# Patient Record
Sex: MALE | Race: ASIAN | NOT HISPANIC OR LATINO | URBAN - METROPOLITAN AREA
[De-identification: names, ages, dates, MRNs, and addresses within clinical notes are randomized per-mention and may not be internally consistent; named-entity substitution may affect disease eponyms.]

---

## 2020-11-07 ENCOUNTER — OUTPATIENT (OUTPATIENT)
Dept: OUTPATIENT SERVICES | Facility: HOSPITAL | Age: 50
LOS: 1 days | End: 2020-11-07
Payer: COMMERCIAL

## 2020-11-07 ENCOUNTER — APPOINTMENT (OUTPATIENT)
Dept: ULTRASOUND IMAGING | Facility: CLINIC | Age: 50
End: 2020-11-07
Payer: COMMERCIAL

## 2020-11-07 DIAGNOSIS — Z00.8 ENCOUNTER FOR OTHER GENERAL EXAMINATION: ICD-10-CM

## 2020-11-07 PROCEDURE — 76700 US EXAM ABDOM COMPLETE: CPT

## 2020-11-07 PROCEDURE — 76700 US EXAM ABDOM COMPLETE: CPT | Mod: 26

## 2022-03-09 DIAGNOSIS — Z01.812 ENCOUNTER FOR PREPROCEDURAL LABORATORY EXAMINATION: ICD-10-CM

## 2022-03-25 ENCOUNTER — NON-APPOINTMENT (OUTPATIENT)
Age: 52
End: 2022-03-25

## 2022-03-25 LAB — SARS-COV-2 N GENE NPH QL NAA+PROBE: NOT DETECTED

## 2022-03-29 ENCOUNTER — NON-APPOINTMENT (OUTPATIENT)
Age: 52
End: 2022-03-29

## 2022-03-30 ENCOUNTER — NON-APPOINTMENT (OUTPATIENT)
Age: 52
End: 2022-03-30

## 2022-03-30 ENCOUNTER — APPOINTMENT (OUTPATIENT)
Dept: PULMONOLOGY | Facility: CLINIC | Age: 52
End: 2022-03-30
Payer: COMMERCIAL

## 2022-03-30 ENCOUNTER — LABORATORY RESULT (OUTPATIENT)
Age: 52
End: 2022-03-30

## 2022-03-30 VITALS
OXYGEN SATURATION: 97 % | WEIGHT: 194 LBS | BODY MASS INDEX: 33.12 KG/M2 | HEART RATE: 87 BPM | SYSTOLIC BLOOD PRESSURE: 125 MMHG | DIASTOLIC BLOOD PRESSURE: 86 MMHG | TEMPERATURE: 97.6 F | HEIGHT: 64 IN

## 2022-03-30 DIAGNOSIS — R10.31 RIGHT LOWER QUADRANT PAIN: ICD-10-CM

## 2022-03-30 DIAGNOSIS — M25.561 PAIN IN RIGHT KNEE: ICD-10-CM

## 2022-03-30 DIAGNOSIS — G89.29 PAIN IN RIGHT KNEE: ICD-10-CM

## 2022-03-30 DIAGNOSIS — M25.562 PAIN IN RIGHT KNEE: ICD-10-CM

## 2022-03-30 LAB
ALBUMIN: 10
BILIRUB UR QL STRIP: NEGATIVE
CLARITY UR: CLEAR
COLLECTION METHOD: NORMAL
CREATININE: 100
GLUCOSE UR-MCNC: NEGATIVE
HCG UR QL: 0.2 EU/DL
HGB UR QL STRIP.AUTO: NEGATIVE
KETONES UR-MCNC: NEGATIVE
LEUKOCYTE ESTERASE UR QL STRIP: NEGATIVE
MICROALBUMIN/CREAT UR TEST STR-RTO: 30
NITRITE UR QL STRIP: NEGATIVE
PH UR STRIP: 5.5
POCT - HEMOGLOBIN (HGB), QUANTITATIVE, TRANSCUTANEOUS: 15.5
PROT UR STRIP-MCNC: NEGATIVE
SP GR UR STRIP: 1.02

## 2022-03-30 PROCEDURE — 88738 HGB QUANT TRANSCUTANEOUS: CPT

## 2022-03-30 PROCEDURE — ZZZZZ: CPT

## 2022-03-30 PROCEDURE — 94729 DIFFUSING CAPACITY: CPT

## 2022-03-30 PROCEDURE — 36415 COLL VENOUS BLD VENIPUNCTURE: CPT

## 2022-03-30 PROCEDURE — 71046 X-RAY EXAM CHEST 2 VIEWS: CPT

## 2022-03-30 PROCEDURE — 82044 UR ALBUMIN SEMIQUANTITATIVE: CPT | Mod: QW

## 2022-03-30 PROCEDURE — 94727 GAS DIL/WSHOT DETER LNG VOL: CPT

## 2022-03-30 PROCEDURE — 99396 PREV VISIT EST AGE 40-64: CPT | Mod: 25

## 2022-03-30 PROCEDURE — 94010 BREATHING CAPACITY TEST: CPT

## 2022-03-30 PROCEDURE — 93000 ELECTROCARDIOGRAM COMPLETE: CPT

## 2022-03-30 PROCEDURE — 81003 URINALYSIS AUTO W/O SCOPE: CPT | Mod: QW

## 2022-03-31 ENCOUNTER — RESULT REVIEW (OUTPATIENT)
Age: 52
End: 2022-03-31

## 2022-03-31 NOTE — DATA REVIEWED
[FreeTextEntry1] : Pulmonary function test performed in my office today: Spirometry is within normal limits; lung volume is within normal limits; diffusion is within normal limits.\par \par EKG shows sinus rhythm at 82 bpm, no acute ST changes.\par \par  POCT - A Urinalysis Dip (Automated)             Final\par \par No Documents Attached\par \par \par   Test   Result   Flag Reference Goal \par   Glucose Negative      \par   Bilirubin Negative      \par   Ketone Negative      \par   Specific Gravity 1.025      \par   Blood Negative      \par   pH 5.5      \par   Protein Negative      \par   Urobilinogen 0.2 EU/dl      \par   Nitrite Negative      \par   Leukocytes Negative      \par   Clarity Clear      \par   Collection Method Void      \par \par  Ordered by: VIRGIL HATCH       Collected/Examined: 30Mar2022 02:31PM       \par Verified by: VIRGIL HATCH 30Mar2022 03:22PM       \par  Result Communication: No patient communication needed at this time;\par Stage: Final       \par  Performed at: In Office       Performed by: VIRGIL HATCH       Resulted: 30Mar2022 02:31PM       Last Updated: 30Mar2022 03:22PM       Accession: [id number]       \par \par \par  Xray Chest 2 Views PA/Lat             Final\par \par \par Chest x-ray PA and lateral views performed in my office today showed clear lungs, no evidence of infiltrates or pleural effusions. \par \par \par  Ordered by: VIRGIL HATCH       Collected/Examined: 30Mar2022 03:21PM       \par Verified by: VIRGIL HATCH 30Mar2022 03:22PM       \par  Result Communication: No patient communication needed at this time;\par Stage: Final       \par  Performed at: In Office       Performed by: VIRGIL HATCH       Resulted: 30Mar2022 03:21PM       Last Updated: 30Mar2022 03:22PM       Accession: [id number]

## 2022-03-31 NOTE — REVIEW OF SYSTEMS
[Negative] : Heme/Lymph [Postnasal Drip] : postnasal drip [Nasal Discharge] : nasal discharge [Cough] : cough [Joint Pain] : joint pain [Joint Stiffness] : joint stiffness [FreeTextEntry9] : Bilateral knee pain

## 2022-03-31 NOTE — PLAN
[FreeTextEntry1] : Start Astelin nasal spray for postnasal drip\par Start home blood pressure log for monitoring.\par Advised Dalton on dieting, exercise and weight loss.\par Start Voltaren as needed for bilateral knee pain/osteoarthritis\par Venipuncture with labs drawn in office\par Age-appropriate counseling and preventive care screening discussed.\par Return for follow-up in 1 month.

## 2022-03-31 NOTE — HISTORY OF PRESENT ILLNESS
[FreeTextEntry1] : Dalton is a pleasant 52-year-old gentleman with history of obesity, fatty liver (VAZQUEZ), he came in complaining of right lower quadrant abdominal pain for years, also complains of chronic bilateral knee pain, also has dry cough for years, with nasal congestion, no chest pain or shortness of breath.  No history of injury.

## 2022-03-31 NOTE — ASSESSMENT
[FreeTextEntry1] : Borderline hypertension.  Bilateral knee pain, likely secondary to osteoarthritis.  Chronic dry cough likely secondary to postnasal drip.  VAZQUEZ likely secondary to history of obesity

## 2022-04-03 LAB
25(OH)D3 SERPL-MCNC: 12.8 NG/ML
ALBUMIN SERPL ELPH-MCNC: 4.6 G/DL
ALP BLD-CCNC: 97 U/L
ALT SERPL-CCNC: 30 U/L
ANION GAP SERPL CALC-SCNC: 17 MMOL/L
AST SERPL-CCNC: 21 U/L
BASOPHILS # BLD AUTO: 0.05 K/UL
BASOPHILS NFR BLD AUTO: 1 %
BILIRUB SERPL-MCNC: 0.6 MG/DL
BUN SERPL-MCNC: 17 MG/DL
CALCIUM SERPL-MCNC: 9.3 MG/DL
CHLORIDE SERPL-SCNC: 108 MMOL/L
CHOLEST SERPL-MCNC: 254 MG/DL
CO2 SERPL-SCNC: 19 MMOL/L
CREAT SERPL-MCNC: 1.32 MG/DL
EGFR: 65 ML/MIN/1.73M2
EOSINOPHIL # BLD AUTO: 0.11 K/UL
EOSINOPHIL NFR BLD AUTO: 2.3 %
GLUCOSE SERPL-MCNC: 96 MG/DL
HCT VFR BLD CALC: 48.7 %
HCV AB SER QL: NONREACTIVE
HCV S/CO RATIO: 0.21 S/CO
HDLC SERPL-MCNC: 42 MG/DL
HGB BLD-MCNC: 16.1 G/DL
IMM GRANULOCYTES NFR BLD AUTO: 0.6 %
LDLC SERPL CALC-MCNC: 154 MG/DL
LYMPHOCYTES # BLD AUTO: 1.5 K/UL
LYMPHOCYTES NFR BLD AUTO: 31.3 %
MAGNESIUM SERPL-MCNC: 2.2 MG/DL
MAN DIFF?: NORMAL
MCHC RBC-ENTMCNC: 29.4 PG
MCHC RBC-ENTMCNC: 33.1 GM/DL
MCV RBC AUTO: 89 FL
MONOCYTES # BLD AUTO: 0.3 K/UL
MONOCYTES NFR BLD AUTO: 6.3 %
NEUTROPHILS # BLD AUTO: 2.81 K/UL
NEUTROPHILS NFR BLD AUTO: 58.5 %
NONHDLC SERPL-MCNC: 212 MG/DL
PHOSPHATE SERPL-MCNC: 3.8 MG/DL
PLATELET # BLD AUTO: 179 K/UL
POTASSIUM SERPL-SCNC: 3.8 MMOL/L
PROT SERPL-MCNC: 7.1 G/DL
PSA SERPL-MCNC: 0.31 NG/ML
RBC # BLD: 5.47 M/UL
RBC # FLD: 12.3 %
SODIUM SERPL-SCNC: 144 MMOL/L
T3 SERPL-MCNC: 75 NG/DL
T3RU NFR SERPL: 1 TBI
T4 FREE SERPL-MCNC: 1.3 NG/DL
T4 SERPL-MCNC: 4.9 UG/DL
TRIGL SERPL-MCNC: 287 MG/DL
TSH SERPL-ACNC: 1.35 UIU/ML
WBC # FLD AUTO: 4.8 K/UL

## 2022-04-14 ENCOUNTER — OUTPATIENT (OUTPATIENT)
Dept: OUTPATIENT SERVICES | Facility: HOSPITAL | Age: 52
LOS: 1 days | End: 2022-04-14
Payer: COMMERCIAL

## 2022-04-14 ENCOUNTER — APPOINTMENT (OUTPATIENT)
Dept: CT IMAGING | Facility: CLINIC | Age: 52
End: 2022-04-14
Payer: COMMERCIAL

## 2022-04-14 DIAGNOSIS — R10.31 RIGHT LOWER QUADRANT PAIN: ICD-10-CM

## 2022-04-14 DIAGNOSIS — Z00.8 ENCOUNTER FOR OTHER GENERAL EXAMINATION: ICD-10-CM

## 2022-04-14 PROCEDURE — 74177 CT ABD & PELVIS W/CONTRAST: CPT

## 2022-04-14 PROCEDURE — 74177 CT ABD & PELVIS W/CONTRAST: CPT | Mod: 26

## 2022-04-19 ENCOUNTER — APPOINTMENT (OUTPATIENT)
Dept: OTOLARYNGOLOGY | Facility: CLINIC | Age: 52
End: 2022-04-19
Payer: COMMERCIAL

## 2022-04-19 VITALS
WEIGHT: 195 LBS | DIASTOLIC BLOOD PRESSURE: 77 MMHG | HEIGHT: 66 IN | HEART RATE: 89 BPM | SYSTOLIC BLOOD PRESSURE: 114 MMHG | BODY MASS INDEX: 31.34 KG/M2

## 2022-04-19 DIAGNOSIS — R04.0 EPISTAXIS: ICD-10-CM

## 2022-04-19 DIAGNOSIS — J32.9 CHRONIC SINUSITIS, UNSPECIFIED: ICD-10-CM

## 2022-04-19 PROCEDURE — 99203 OFFICE O/P NEW LOW 30 MIN: CPT

## 2022-04-19 NOTE — REVIEW OF SYSTEMS
[Nasal Congestion] : nasal congestion [Nose Bleeds] : nose bleeds [Problem Snoring] : problem snoring [Eyes Itch] : itching of the eyes [Negative] : Heme/Lymph [Patient Intake Form Reviewed] : Patient intake form was reviewed

## 2022-04-19 NOTE — PHYSICAL EXAM
[de-identified] : NASAL MUCOSAL IRRITATION/ EXPOSED BLOOD VESSELS OVER THE SEPTUM AND INFERIOR TURBINATE/ DRY MUCOSA [Normal] : mucosa is normal [Midline] : trachea located in midline position

## 2022-04-19 NOTE — ASSESSMENT
[FreeTextEntry1] : MUPIROCIN BID\par NASAL SALINE SPRAY\par EUCALYPTUS HUMIDIFIER\par CT SINUS\par F/U AFTER ABOVE

## 2022-04-19 NOTE — HISTORY OF PRESENT ILLNESS
[de-identified] : RECURRENT STUFFY NOSE FOR A WHILE\par MEDICAL HX REVIEWED\par SOMETIME HAS EPISTAXIS\par

## 2022-04-20 ENCOUNTER — APPOINTMENT (OUTPATIENT)
Dept: CT IMAGING | Facility: CLINIC | Age: 52
End: 2022-04-20
Payer: COMMERCIAL

## 2022-04-20 ENCOUNTER — OUTPATIENT (OUTPATIENT)
Dept: OUTPATIENT SERVICES | Facility: HOSPITAL | Age: 52
LOS: 1 days | End: 2022-04-20
Payer: COMMERCIAL

## 2022-04-20 ENCOUNTER — APPOINTMENT (OUTPATIENT)
Dept: ORTHOPEDIC SURGERY | Facility: CLINIC | Age: 52
End: 2022-04-20

## 2022-04-20 DIAGNOSIS — Z78.9 OTHER SPECIFIED HEALTH STATUS: ICD-10-CM

## 2022-04-20 DIAGNOSIS — J32.9 CHRONIC SINUSITIS, UNSPECIFIED: ICD-10-CM

## 2022-04-20 DIAGNOSIS — M17.12 UNILATERAL PRIMARY OSTEOARTHRITIS, LEFT KNEE: ICD-10-CM

## 2022-04-20 DIAGNOSIS — Z80.9 FAMILY HISTORY OF MALIGNANT NEOPLASM, UNSPECIFIED: ICD-10-CM

## 2022-04-20 PROCEDURE — 70486 CT MAXILLOFACIAL W/O DYE: CPT | Mod: 26

## 2022-04-20 PROCEDURE — 99203 OFFICE O/P NEW LOW 30 MIN: CPT

## 2022-04-20 PROCEDURE — 70486 CT MAXILLOFACIAL W/O DYE: CPT

## 2022-04-20 PROCEDURE — 73560 X-RAY EXAM OF KNEE 1 OR 2: CPT | Mod: LT

## 2022-04-25 VITALS — HEIGHT: 66 IN | BODY MASS INDEX: 31.34 KG/M2 | WEIGHT: 195 LBS

## 2022-04-25 NOTE — CONSULT LETTER
[Dear  ___] : Dear  [unfilled], [Consult Letter:] : I had the pleasure of evaluating your patient, [unfilled]. [Please see my note below.] : Please see my note below. [Consult Closing:] : Thank you very much for allowing me to participate in the care of this patient.  If you have any questions, please do not hesitate to contact me. [Sincerely,] : Sincerely, [FreeTextEntry3] : Valentin Rooney III, MD \par SIL[facility]\par

## 2022-04-25 NOTE — HISTORY OF PRESENT ILLNESS
[de-identified] : The patient comes in today with complaints referable to his left knee.  He states it has been going on for the last year and getting a little worse recently.  The patient states the pain is intermittent.  The patient describes the pain as cramping, uncomfortable and sore. [] : No

## 2022-04-25 NOTE — PHYSICAL EXAM
[de-identified] : Right Knee: Range of Motion in Degrees\par 	\par 	                  Claimant:    Normal:	\par Flexion Active	    135 	    135-degrees	\par Flexion Passive	    135	    135-degrees	\par Extension Active	    0-5	    0-5-degrees	\par Extension Passive	    0-5	    0-5-degrees	\par \par No weakness to flexion/extension.  No evidence of instability in the AP plane or varus or valgus stress.  Negative  Lachman.  Negative pivot shift.  Negative anterior drawer test.  Negative posterior drawer test.  Negative Ancelmo.  Negative Apley grind.  No medial or lateral joint line tenderness.  No tenderness over the medial and lateral facet of the patella.  No patellofemoral crepitations.  No lateral tilting patella.  No patellar apprehension.  No crepitation in the medial and lateral femoral condyle.  No proximal or distal swelling, edema or tenderness.  No gross motor or sensory deficits.  No intra-articular swelling.  2+ DP and PT pulses. No varus or valgus malalignment.  Skin is intact.  No rashes, scars or lesions.  \par  \par Right Knee: Range of Motion in Degrees	\par 	                  Claimant:	Normal:	\par Flexion Active	  135 	                135-degrees	\par Flexion Passive	  135	                135-degrees	\par Extension Active	  0-5	                0-5-degrees	\par Extension Passive	  0-5	                0-5-degrees\par \par No weakness to flexion/extension.  No evidence of instability in the AP plane or varus or valgus stress.  Negative  Lachman.  Negative pivot shift.  Negative anterior drawer test.  Negative posterior drawer test.  Negative Ancelmo.  Negative Apley grind.  No medial or lateral joint line tenderness.  Positive tenderness over the lateral facet of the patella.  No tenderness over the medial facet of the patella.  Positive patellofemoral crepitations.  No lateral tilting patella.  No patella apprehension.  No crepitation in the medial and lateral femoral condyle.  Mild effusion.  No gross motor or sensory deficits.  2+ DP and PT pulses.  No varus or valgus malalignment.  Skin is intact.  No rashes, scars or lesions.  \par   [de-identified] : Gait and Station:  Ambulating with a slightly antalgic to antalgic gait.  Normal Station.  [de-identified] : Appearance:  Well developed, well-nourished male in no acute distress.\par   [de-identified] : Radiographs, one to two views of the left knee, show a cyst in the patella with narrowing of the patellofemoral joint space.\par

## 2022-04-25 NOTE — ADDENDUM
[FreeTextEntry1] : This note was written by Sim Yan on 04/25/2022, acting as a scribe for Valentin Rooney III, MD

## 2022-05-09 ENCOUNTER — LABORATORY RESULT (OUTPATIENT)
Age: 52
End: 2022-05-09

## 2022-05-13 ENCOUNTER — APPOINTMENT (OUTPATIENT)
Dept: PULMONOLOGY | Facility: CLINIC | Age: 52
End: 2022-05-13

## 2022-05-15 LAB
25(OH)D3 SERPL-MCNC: 24 NG/ML
ALBUMIN SERPL ELPH-MCNC: 4.6 G/DL
ALP BLD-CCNC: 98 U/L
ALT SERPL-CCNC: 29 U/L
ANION GAP SERPL CALC-SCNC: 11 MMOL/L
AST SERPL-CCNC: 22 U/L
BILIRUB SERPL-MCNC: 0.9 MG/DL
BUN SERPL-MCNC: 18 MG/DL
CALCIUM SERPL-MCNC: 9 MG/DL
CHLORIDE SERPL-SCNC: 107 MMOL/L
CHOLEST SERPL-MCNC: 180 MG/DL
CO2 SERPL-SCNC: 26 MMOL/L
CREAT SERPL-MCNC: 1.27 MG/DL
EGFR: 68 ML/MIN/1.73M2
GLUCOSE SERPL-MCNC: 105 MG/DL
HDLC SERPL-MCNC: 45 MG/DL
LDLC SERPL CALC-MCNC: 99 MG/DL
NONHDLC SERPL-MCNC: 135 MG/DL
POTASSIUM SERPL-SCNC: 4.2 MMOL/L
PROT SERPL-MCNC: 6.8 G/DL
SODIUM SERPL-SCNC: 145 MMOL/L
T4 FREE SERPL-MCNC: 1.3 NG/DL
TRIGL SERPL-MCNC: 182 MG/DL
TSH SERPL-ACNC: 2.07 UIU/ML

## 2022-09-15 ENCOUNTER — APPOINTMENT (OUTPATIENT)
Dept: OTOLARYNGOLOGY | Facility: CLINIC | Age: 52
End: 2022-09-15

## 2022-09-15 VITALS
DIASTOLIC BLOOD PRESSURE: 83 MMHG | SYSTOLIC BLOOD PRESSURE: 122 MMHG | HEIGHT: 66 IN | HEART RATE: 94 BPM | WEIGHT: 188 LBS | BODY MASS INDEX: 30.22 KG/M2

## 2022-09-15 DIAGNOSIS — J31.0 CHRONIC RHINITIS: ICD-10-CM

## 2022-09-15 PROCEDURE — 99214 OFFICE O/P EST MOD 30 MIN: CPT | Mod: 25

## 2022-09-15 PROCEDURE — 31231 NASAL ENDOSCOPY DX: CPT

## 2022-09-15 NOTE — HISTORY OF PRESENT ILLNESS
[de-identified] : nose on the rightside sometime feels stuffy\par wants to make sure that he does not have any mass or cancer\par medical hx reviewed\par no fever

## 2022-09-15 NOTE — PHYSICAL EXAM
[de-identified] : dry nasal mucosa [Normal] : mucosa is normal [Midline] : trachea located in midline position

## 2022-09-15 NOTE — ASSESSMENT
[FreeTextEntry1] : CT SINUS REVIEWED\par NASAL SALINE SPRAY\par NASAL CYCLE EXPLAINED\par EUCALYPTUS HUMIDIFIER\par F/U 3 MONTHS PRN

## 2022-10-25 ENCOUNTER — APPOINTMENT (OUTPATIENT)
Dept: SURGERY | Facility: CLINIC | Age: 52
End: 2022-10-25

## 2022-10-25 VITALS
WEIGHT: 188 LBS | HEIGHT: 66 IN | DIASTOLIC BLOOD PRESSURE: 86 MMHG | OXYGEN SATURATION: 99 % | SYSTOLIC BLOOD PRESSURE: 126 MMHG | BODY MASS INDEX: 30.22 KG/M2 | HEART RATE: 80 BPM

## 2022-10-25 PROCEDURE — 99202 OFFICE O/P NEW SF 15 MIN: CPT

## 2022-11-06 ENCOUNTER — TRANSCRIPTION ENCOUNTER (OUTPATIENT)
Age: 52
End: 2022-11-06

## 2022-11-07 ENCOUNTER — RESULT REVIEW (OUTPATIENT)
Age: 52
End: 2022-11-07

## 2022-11-07 ENCOUNTER — OUTPATIENT (OUTPATIENT)
Dept: OUTPATIENT SERVICES | Facility: HOSPITAL | Age: 52
LOS: 1 days | End: 2022-11-07
Payer: COMMERCIAL

## 2022-11-07 ENCOUNTER — APPOINTMENT (OUTPATIENT)
Dept: SURGERY | Facility: HOSPITAL | Age: 52
End: 2022-11-07

## 2022-11-07 DIAGNOSIS — L72.0 EPIDERMAL CYST: ICD-10-CM

## 2022-11-07 PROCEDURE — 88304 TISSUE EXAM BY PATHOLOGIST: CPT | Mod: 26

## 2022-11-07 PROCEDURE — 88304 TISSUE EXAM BY PATHOLOGIST: CPT

## 2022-11-07 PROCEDURE — 11402 EXC TR-EXT B9+MARG 1.1-2 CM: CPT

## 2022-11-15 ENCOUNTER — APPOINTMENT (OUTPATIENT)
Dept: SURGERY | Facility: CLINIC | Age: 52
End: 2022-11-15

## 2022-11-15 PROCEDURE — 99213 OFFICE O/P EST LOW 20 MIN: CPT | Mod: 24

## 2022-11-17 ENCOUNTER — RESULT REVIEW (OUTPATIENT)
Age: 52
End: 2022-11-17

## 2023-01-27 ENCOUNTER — NON-APPOINTMENT (OUTPATIENT)
Age: 53
End: 2023-01-27

## 2023-02-03 ENCOUNTER — NON-APPOINTMENT (OUTPATIENT)
Age: 53
End: 2023-02-03

## 2023-02-03 ENCOUNTER — LABORATORY RESULT (OUTPATIENT)
Age: 53
End: 2023-02-03

## 2023-02-03 ENCOUNTER — APPOINTMENT (OUTPATIENT)
Dept: PULMONOLOGY | Facility: CLINIC | Age: 53
End: 2023-02-03

## 2023-02-03 ENCOUNTER — APPOINTMENT (OUTPATIENT)
Dept: PULMONOLOGY | Facility: CLINIC | Age: 53
End: 2023-02-03
Payer: COMMERCIAL

## 2023-02-03 VITALS
BODY MASS INDEX: 31.42 KG/M2 | HEIGHT: 66 IN | SYSTOLIC BLOOD PRESSURE: 123 MMHG | DIASTOLIC BLOOD PRESSURE: 82 MMHG | HEART RATE: 99 BPM | WEIGHT: 195.5 LBS | OXYGEN SATURATION: 96 %

## 2023-02-03 DIAGNOSIS — R04.2 HEMOPTYSIS: ICD-10-CM

## 2023-02-03 DIAGNOSIS — R03.0 ELEVATED BLOOD-PRESSURE READING, W/OUT DIAGNOSIS OF HYPERTENSION: ICD-10-CM

## 2023-02-03 PROCEDURE — 94727 GAS DIL/WSHOT DETER LNG VOL: CPT

## 2023-02-03 PROCEDURE — 88738 HGB QUANT TRANSCUTANEOUS: CPT

## 2023-02-03 PROCEDURE — 94729 DIFFUSING CAPACITY: CPT

## 2023-02-03 PROCEDURE — 36415 COLL VENOUS BLD VENIPUNCTURE: CPT

## 2023-02-03 PROCEDURE — 83036 HEMOGLOBIN GLYCOSYLATED A1C: CPT | Mod: QW

## 2023-02-03 PROCEDURE — 99396 PREV VISIT EST AGE 40-64: CPT | Mod: 25

## 2023-02-03 PROCEDURE — 71046 X-RAY EXAM CHEST 2 VIEWS: CPT

## 2023-02-03 PROCEDURE — 82044 UR ALBUMIN SEMIQUANTITATIVE: CPT | Mod: QW

## 2023-02-03 PROCEDURE — 81003 URINALYSIS AUTO W/O SCOPE: CPT | Mod: QW

## 2023-02-03 PROCEDURE — 95012 NITRIC OXIDE EXP GAS DETER: CPT

## 2023-02-03 PROCEDURE — 94010 BREATHING CAPACITY TEST: CPT

## 2023-02-03 PROCEDURE — 93000 ELECTROCARDIOGRAM COMPLETE: CPT

## 2023-02-04 ENCOUNTER — OUTPATIENT (OUTPATIENT)
Dept: OUTPATIENT SERVICES | Facility: HOSPITAL | Age: 53
LOS: 1 days | End: 2023-02-04
Payer: COMMERCIAL

## 2023-02-04 ENCOUNTER — APPOINTMENT (OUTPATIENT)
Dept: CT IMAGING | Facility: CLINIC | Age: 53
End: 2023-02-04
Payer: COMMERCIAL

## 2023-02-04 DIAGNOSIS — R04.2 HEMOPTYSIS: ICD-10-CM

## 2023-02-04 DIAGNOSIS — Z00.8 ENCOUNTER FOR OTHER GENERAL EXAMINATION: ICD-10-CM

## 2023-02-04 LAB
25(OH)D3 SERPL-MCNC: 19.7 NG/ML
ALBUMIN SERPL ELPH-MCNC: 4.5 G/DL
ALBUMIN: 10
ALP BLD-CCNC: 91 U/L
ALT SERPL-CCNC: 29 U/L
ANION GAP SERPL CALC-SCNC: 15 MMOL/L
APPEARANCE: CLEAR
AST SERPL-CCNC: 22 U/L
BACTERIA: NEGATIVE
BASOPHILS # BLD AUTO: 0.06 K/UL
BASOPHILS NFR BLD AUTO: 1.3 %
BILIRUB SERPL-MCNC: 0.8 MG/DL
BILIRUB UR QL STRIP: NORMAL
BILIRUBIN URINE: NEGATIVE
BLOOD URINE: NEGATIVE
BUN SERPL-MCNC: 16 MG/DL
CALCIUM SERPL-MCNC: 9.1 MG/DL
CHLORIDE SERPL-SCNC: 107 MMOL/L
CHOLEST SERPL-MCNC: 222 MG/DL
CLARITY UR: CLEAR
CO2 SERPL-SCNC: 20 MMOL/L
COLLECTION METHOD: NORMAL
COLOR: NORMAL
CREAT SERPL-MCNC: 1.25 MG/DL
CREAT SPEC-SCNC: 137 MG/DL
CREATININE: 200
EGFR: 69 ML/MIN/1.73M2
EOSINOPHIL # BLD AUTO: 0.14 K/UL
EOSINOPHIL NFR BLD AUTO: 3 %
GLUCOSE QUALITATIVE U: NEGATIVE
GLUCOSE SERPL-MCNC: 111 MG/DL
GLUCOSE UR-MCNC: NORMAL
HBA1C MFR BLD HPLC: 5.6
HCG UR QL: 0.2 EU/DL
HCT VFR BLD CALC: 47.1 %
HCV AB SER QL: NONREACTIVE
HCV S/CO RATIO: 0.14 S/CO
HDLC SERPL-MCNC: 43 MG/DL
HGB BLD-MCNC: 16 G/DL
HGB UR QL STRIP.AUTO: NORMAL
HYALINE CASTS: 1 /LPF
IMM GRANULOCYTES NFR BLD AUTO: 0.4 %
KETONES UR-MCNC: NORMAL
KETONES URINE: NEGATIVE
LDLC SERPL CALC-MCNC: 130 MG/DL
LEUKOCYTE ESTERASE UR QL STRIP: NORMAL
LEUKOCYTE ESTERASE URINE: NEGATIVE
LYMPHOCYTES # BLD AUTO: 1.34 K/UL
LYMPHOCYTES NFR BLD AUTO: 29 %
MAGNESIUM SERPL-MCNC: 2.2 MG/DL
MAN DIFF?: NORMAL
MCHC RBC-ENTMCNC: 29.6 PG
MCHC RBC-ENTMCNC: 34 GM/DL
MCV RBC AUTO: 87.1 FL
MICROALBUMIN 24H UR DL<=1MG/L-MCNC: <1.2 MG/DL
MICROALBUMIN/CREAT 24H UR-RTO: NORMAL MG/G
MICROALBUMIN/CREAT UR TEST STR-RTO: <30
MICROSCOPIC-UA: NORMAL
MONOCYTES # BLD AUTO: 0.28 K/UL
MONOCYTES NFR BLD AUTO: 6.1 %
NEUTROPHILS # BLD AUTO: 2.78 K/UL
NEUTROPHILS NFR BLD AUTO: 60.2 %
NITRITE UR QL STRIP: NORMAL
NITRITE URINE: NEGATIVE
NONHDLC SERPL-MCNC: 180 MG/DL
PH UR STRIP: 5
PH URINE: 5.5
PHOSPHATE SERPL-MCNC: 3.3 MG/DL
PLATELET # BLD AUTO: 176 K/UL
POCT - HEMOGLOBIN (HGB), QUANTITATIVE, TRANSCUTANEOUS: 16.3
POTASSIUM SERPL-SCNC: 4 MMOL/L
PROT SERPL-MCNC: 6.5 G/DL
PROT UR STRIP-MCNC: NORMAL
PROTEIN URINE: NORMAL
PSA SERPL-MCNC: 0.34 NG/ML
RBC # BLD: 5.41 M/UL
RBC # FLD: 12.2 %
RED BLOOD CELLS URINE: 2 /HPF
SODIUM SERPL-SCNC: 142 MMOL/L
SP GR UR STRIP: 1.02
SPECIFIC GRAVITY URINE: 1.02
SQUAMOUS EPITHELIAL CELLS: 1 /HPF
T3 SERPL-MCNC: 84 NG/DL
T3RU NFR SERPL: 0.9 TBI
T4 FREE SERPL-MCNC: 1.6 NG/DL
T4 SERPL-MCNC: 5.4 UG/DL
TRIGL SERPL-MCNC: 246 MG/DL
TSH SERPL-ACNC: 0.95 UIU/ML
UROBILINOGEN URINE: NORMAL
WBC # FLD AUTO: 4.62 K/UL
WHITE BLOOD CELLS URINE: 1 /HPF

## 2023-02-04 PROCEDURE — 71250 CT THORAX DX C-: CPT

## 2023-02-04 PROCEDURE — 71250 CT THORAX DX C-: CPT | Mod: 26

## 2023-02-04 NOTE — ADDENDUM
[FreeTextEntry1] : I, Negro Michael, acted solely as a scribe for Dr. Zully Singh D.O., on this date 02/03/2023. \par \par All medical record entries made by the Scribe were at my, Dr. Zully Singh D.O., direction and personally dictated by me on 02/03/2023. I have reviewed the chart and agree that the record accurately reflects my personal performance of the history, physical exam, assessment and plan. I have also personally directed, reviewed, and agreed with the chart.

## 2023-02-04 NOTE — REVIEW OF SYSTEMS
[Negative] : Heme/Lymph [FreeTextEntry4] : Hemoptysis [FreeTextEntry6] : Intermittent cough, Chest congestion, Snoring

## 2023-02-04 NOTE — HEALTH RISK ASSESSMENT
[Patient reported colonoscopy was normal] : Patient reported colonoscopy was normal [ColonoscopyDate] : 2022

## 2023-02-04 NOTE — HISTORY OF PRESENT ILLNESS
[FreeTextEntry1] : Follow up [de-identified] : CESAR SHERWOOD is a 52 year old male, with history of history of obesity, fatty liver (VAZQUEZ), who presents to the office for follow up evaluation. Patient reports that he is feeling well overall with no complaints. He reports of having an intermittent dry cough that started 3 months ago. Patient reports that his symptoms are accompanied by chest congestion, and hemoptysis. He denies of any bowel movement or urination problems. He states that he used to take Atorvastatin for elevated cholesterol levels but has stopped due to difficulties taking it. Patient denies of any smoking history. He also states of having symptoms of excessive daytime sleepiness/snoring. Patient reports that he has never received a home sleep study.

## 2023-02-04 NOTE — DATA REVIEWED
[FreeTextEntry1] : Pulmonary Function Test obtained in office today which revealed: Spirometry: Mild obstructive airway disease, Lung Volume: Within normal limits with air trapping, Diffusion: Within normal limits.\par ___\par EKG shows sinus rhythm at 85 bpm, no acute ST changes \par ___\par \par  Xray Chest 2 Views PA/Lat             Final\par \par No Documents Attached\par \par \par \par \par   \par Chest x-ray PA and lateral views performed in my office today showed clear lungs, no evidence of infiltrates or pleural effusions. \par \par \par  Ordered by: VIRGIL HATCH       Collected/Examined: 03Feb2023 11:02AM       \par Verification Required       Stage: Final       \par  Performed at: In Office       Performed by: VIRGIL HATCH       Resulted: 03Feb2023 11:02AM       Last Updated: 03Feb2023 11:02AM       \par ___\par \par  Exhaled Nitric Oxide             Final\par \par No Documents Attached\par \par \par   Test   Result   Flag Reference Goal Last Verified \par   Exhaled Nitric Oxide 20      REQUIRED \par \par  Ordered by: VIRGIL HATCH       Collected/Examined: 03Feb2023 10:41AM       \par Verification Required       Stage: Final       \par  Performed at: In Office       Performed by: VIRGIL HATCH       Resulted: 03Feb2023 10:41AM       Last Updated: 03Feb2023 10:41AM       \par ___\par  POCT - MicroAlbumin             Final\par \par No Documents Attached\par \par \par   Test   Result   Flag Reference Goal Last Verified \par   Albumin 10      REQUIRED \par   Albumin to Creatinine Ratio <30      REQUIRED \par   Creatinine 200      REQUIRED \par \par  Ordered by: VIRGIL HATCH       Collected/Examined: 03Feb2023 10:13AM       \par Verification Required       Stage: Final       \par  Performed at: In Office       Performed by: VIRGIL HATCH       Resulted: 03Feb2023 10:13AM       Last Updated: 03Feb2023 10:13AM       \par ___\par \par  POCT - A Urinalysis Dip (Automated)             Final\par \par No Documents Attached\par \par \par   Test   Result   Flag Reference Goal Last Verified \par   Glucose NEG      REQUIRED \par   Bilirubin NEG      REQUIRED \par   Ketone NEG      REQUIRED \par   Specific Gravity 1.020      REQUIRED \par   Blood TRACE-INTACT      REQUIRED \par   pH 5.0      REQUIRED \par   Protein NEG      REQUIRED \par   Urobilinogen 0.2 EU/dl      REQUIRED \par   Nitrite NEG      REQUIRED \par   Leukocytes NEG      REQUIRED \par   Clarity Clear      REQUIRED \par   Collection Method Void      REQUIRED \par \par  Ordered by: VIRGIL HATCH       Collected/Examined: 03Feb2023 10:12AM       \par Verification Required       Stage: Final       \par  Performed at: In Office       Performed by: VIRGIL HATCH       Resulted: 03Feb2023 10:12AM       Last Updated: 03Feb2023 10:13AM       \par ___\par \par  POCT - Hemoglobin A1C             Final\par \par No Documents Attached\par \par \par   Test   Result   Flag Reference Goal Last Verified \par   POCT Hemoglobin A1C 5.6   Normal: 4.0 - 5.6  REQUIRED \par \par  Ordered by: VIRGIL HATCH       Collected/Examined: 03Feb2023 10:33AM       \par Verification Required       Stage: Final       \par  Performed at: In Office       Performed by: VIRGIL HATCH       Resulted: 03Feb2023 10:33AM       Last Updated: 03Feb2023 10:37AM       \par ___\par \par  POCT - Hemoglobin (Hgb), quantitative, transcutaneous             Final\par \par No Documents Attached\par \par \par   Test   Result   Flag Reference Goal Last Verified \par   POCT - Hemoglobin (Hgb), quantitative, transcutaneous 16.3      REQUIRED \par \par  Ordered by: VIRGIL HATCH       Collected/Examined: 03Feb2023 10:33AM       \par Verification Required       Stage: Final       \par  Performed at: In Office       Performed by: ESTEFANIA BARNETT       Resulted: 03Feb2023 10:33AM       Last Updated: 03Feb2023 10:33AM       \par

## 2023-02-04 NOTE — PLAN
[FreeTextEntry1] : Based on history and physical exam the patient has a high likelihood of having obstructive sleep apnea. Further assessment by sleep testing is recommended. There is no contraindication to a home sleep study. We will therefore proceed to two night home sleep study for further assessment. \par Age appropriate preventative care counseling discussed with patient.\par Venipuncture with labs drawn in office.\par Obtained and reviewed EKG, Urinalysis, A1C, PFT, NIOX, CXR with patient today.\par Advised patient to visit GI to follow up on his VAZQUEZ and abdominal pain.\par Advised patient to visit Dr. Venkat James further Urology evaluation.\par Sent urine to lab for further evaluation of microscopic hematuria found in Urinalysis today.\par Continue home blood pressure log for monitoring.\par Advised patient to receive a chest CT scan for further evaluation of his hemoptysis.\par Prescribed Z-Edgard and started patient on a Prednisone taper for cough likely secondary to asthmatic bronchitis.\par Continue Atorvastatin 10 mg for elevated cholesterol levels.\par Advised patient to fast for at least 5 hours prior to next visit for accurate lipid level.\par Return for follow up 1 month of home sleepy study, chest CT scan, and updated lab results.

## 2023-02-04 NOTE — ASSESSMENT
[FreeTextEntry1] : Mr. CESAR SHERWOOD is an 52 year old male, history of Borderline hypertension, Vitamin D deficiency, elevated cholesterol levels. Patient has VAZQUEZ likely secondary to history of obesity. He also has a cough likely secondary to asthmatic bronchitis. Patient has has symptoms of snoring likely secondary to Obstructive sleep apnea, will receive home sleep study for further analysis. He also has symptoms of hemoptysis likely secondary to coughing and asthmatic bronchitis, ordered chest CT scan to rule out lung lesion for further evaluation.

## 2023-02-05 LAB — BACTERIA UR CULT: NORMAL

## 2023-02-08 ENCOUNTER — APPOINTMENT (OUTPATIENT)
Dept: PULMONOLOGY | Facility: CLINIC | Age: 53
End: 2023-02-08
Payer: COMMERCIAL

## 2023-02-08 PROCEDURE — 95800 SLP STDY UNATTENDED: CPT

## 2023-02-09 PROCEDURE — 95800 SLP STDY UNATTENDED: CPT

## 2023-02-22 ENCOUNTER — TRANSCRIPTION ENCOUNTER (OUTPATIENT)
Age: 53
End: 2023-02-22

## 2023-03-09 ENCOUNTER — APPOINTMENT (OUTPATIENT)
Dept: PULMONOLOGY | Facility: CLINIC | Age: 53
End: 2023-03-09
Payer: COMMERCIAL

## 2023-03-09 VITALS — OXYGEN SATURATION: 96 % | SYSTOLIC BLOOD PRESSURE: 132 MMHG | HEART RATE: 84 BPM | DIASTOLIC BLOOD PRESSURE: 80 MMHG

## 2023-03-09 PROCEDURE — 99214 OFFICE O/P EST MOD 30 MIN: CPT | Mod: 25

## 2023-03-09 PROCEDURE — 36415 COLL VENOUS BLD VENIPUNCTURE: CPT

## 2023-03-09 NOTE — HISTORY OF PRESENT ILLNESS
[FreeTextEntry1] : Follow up [de-identified] : CESAR SHERWOOD is a 52 year old male, with history of history of obesity, fatty liver (VAZQUEZ), who presents to the office for follow up evaluation. Patient reports that he is feeling well overall with no complaints. He reports of having an intermittent dry cough that started 3 months ago. Patient reports that his symptoms are accompanied by chest congestion, and hemoptysis. He denies of any bowel movement or urination problems. He states that he used to take Atorvastatin for elevated cholesterol levels but has stopped due to difficulties taking it. Patient denies of any smoking history. He also states of having symptoms of excessive daytime sleepiness/snoring. Patient reports that he has never received a home sleep study.

## 2023-03-09 NOTE — PROCEDURE
[FreeTextEntry1] : Home sleep study performed on February 9, 2023 showed evidence of severe obstructive sleep apnea with overall AHI of 47 events per hour sleep.

## 2023-03-09 NOTE — PLAN
[FreeTextEntry1] : Discussed with patient at length regarding the aforementioned sleep study findings and all treatment options in the office today, will start patient on APAP(Auto-titrating positive airway pressure) at 5-20 cm H2O via face mask at this point.\par Start Flonase nasal spray for postnasal drip.\par Venipuncture with labs drawn in office.\par Advised patient to visit GI to follow up on his VAZQUEZ and abdominal pain.\par Advised patient to visit Dr. Venkat James further Urology evaluation.\par Continue home blood pressure log for monitoring.\par Advised patient to receive a chest CT scan for further evaluation of his hemoptysis.\par Continue Atorvastatin 10 mg for elevated cholesterol levels/hypercholesterolemia.\par Advised patient to fast for at least 5 hours prior to next visit for accurate lipid level.\par Return for follow up 1 month of home sleepy study, chest CT scan, and updated lab results.

## 2023-03-09 NOTE — REASON FOR VISIT
Endocrinology Clinic Progress Note  PCP: Rebekah Calix M.D.    HPI:  Alba Olivas Jr. is a 55 y.o. old patient who comes in today for review of endocrine problems.    Papillary carcinoma of thyroid:  History of papillary thyroid carcinoma in 2007 with total thyroidectomy.  The patient had radioactive iodine ablation  on 9/11/19 for residual thyroid tissue.  Nuclear Med Body Scan done on 9/30/19 shows three discrete foci of increased uptake in the thyroid bed suggesting residual/recurrent thyroid tissue/disease and no distant metastatic disease.     Hypothyroidism:  Currently taking Levothyroxine 100 mcg daily. Results for ALBA OLIVAS JR. (MRN [id number]) as of 1/7/2020 11:12   Ref. Range 1/6/2020 12:29   TSH Latest Ref Range: 0.380 - 5.330 uIU/mL 1.560   Free T-4 Latest Ref Range: 0.53 - 1.43 ng/dL 0.86   T3 Latest Ref Range: 60.0 - 181.0 ng/dL 115.1   T3,Free Latest Ref Range: 2.40 - 4.20 pg/mL 3.29           Vitamin D Deficiency:  Currently taking Vitamin D 2000 units daily.  Plus Vitamin D in Calcium supplement.  Results for ALBA OLIVAS JR. (MRN [id number]) as of 1/7/2020 11:12   Ref. Range 1/6/2020 12:29   25-Hydroxy   Vitamin D 25 Latest Ref Range: 30 - 100 ng/mL 48       ROS:  Constitutional: No unintentional weight loss  Endo: Denies excessive thirst or frequent urination  All other systems were reviewed and were negative.    Past Medical History:  Patient Active Problem List    Diagnosis Date Noted   • Sedative, hypnotic or anxiolytic dependence (HCC)    • Vitamin D deficiency 10/29/2015   • Essential hypertension 07/13/2015   • Parotid gland enlargement 06/24/2015   • HTN (hypertension)    • ASTHMA 01/09/2013   • Postsurgical hypothyroidism 10/24/2012   • Hypothyroidism    • Insomnia    • Papillary carcinoma of thyroid (HCC)        Medications:    Current Outpatient Medications:   •  Vitamin D, Ergocalciferol, 2000 units Cap, Take  by mouth., Disp: , Rfl:   •  atorvastatin  "(LIPITOR) 20 MG Tab, TAKE 1 TABLET BY MOUTH EVERY DAY, Disp: 90 Tab, Rfl: 1  •  zolpidem (AMBIEN) 10 MG Tab, Take 1 Tab by mouth at bedtime as needed for Sleep for up to 30 days., Disp: 30 Tab, Rfl: 0  •  meloxicam (MOBIC) 15 MG tablet, TAKE 1 TABLET BY MOUTH EVERY DAY, Disp: 30 Tab, Rfl: 1  •  losartan (COZAAR) 25 MG Tab, Take 1 Tab by mouth every day., Disp: 90 Tab, Rfl: 1  •  Aug Betamethasone Dipropionate (DIPROLENE-AF) 0.05 % Cream, Apply thinly to affected areas twice daily., Disp: 30 g, Rfl: 1  •  levothyroxine (SYNTHROID) 100 MCG Tab, Take 1 Tab by mouth every day., Disp: 90 Tab, Rfl: 3  •  Calcium Carb-Cholecalciferol (CALCIUM 600 + D PO), Take  by mouth 2 Times a Day., Disp: , Rfl:     Labs: Reviewed    Physical Examination:  Vital signs: /84   Pulse 72   Ht 1.676 m (5' 6\")   Wt 86.2 kg (190 lb)   SpO2 94%   BMI 30.67 kg/m²  Body mass index is 30.67 kg/m². Patient's body mass index is 30.67 kg/m². Exercise and nutrition counseling were performed at this visit.  Walking a lot at work.  General: No apparent distress, cooperative  Eyes: No scleral icterus, no discharge, normal eyelids  Neck: No abnormal masses on inspection,Scar from previous thyroidectomy present.   Resp: Normal effort, clear to auscultation bilaterally  CVS: Regular rate and rhythm, S1 S2 normal, no murmur  Extremities: No lower extremity edema  Abdomen: abdominal obesity present  Musculoskeletal: Normal digits and nails  Skin: No rash on visible skin  Psych: Alert and oriented, normal mood and affect, intact memory and able to make informed decisions.    Assessment and Plan:    1. Papillary adenocarcinoma of thyroid    s/p total thyroidectomy and ROA ablation.   Doing well overall and feels good.     2. Postoperative hypothyroidism  Continue current dose of thyroid hormone replacement.     3. Vitamin D deficiency  Recommend to continue over the counter vit D 2 or D 3 : 8000-10,000 IU daily with food.   Side effects and benefits " discussed with patient in detail.     Return in about 3 months (around 4/7/2020).    Thank you for allowing me to participate in the care of this patient.    This note was scribed by Ave Lucas RN, CDE    CC:   Rbeekah Calix M.D.    This note was created using voice recognition software (Dragon). The accuracy of the dictation is limited by the abilities of the software. I have reviewed the note prior to signing, however some errors in grammar and context are still possible. If you have any questions related to this note please do not hesitate to contact our office.      [Annual Wellness Visit] : an annual wellness visit [Abnormal CXR/ Chest CT] : an abnormal CXR/ chest CT [Hypersomnolence] : hypersomnolence [Cough] : cough [Follow-Up] : a follow-up visit [Sleep Apnea] : sleep apnea

## 2023-03-12 LAB
ALBUMIN SERPL ELPH-MCNC: 4.6 G/DL
ALP BLD-CCNC: 91 U/L
ALT SERPL-CCNC: 32 U/L
ANION GAP SERPL CALC-SCNC: 14 MMOL/L
AST SERPL-CCNC: 26 U/L
BASOPHILS # BLD AUTO: 0.07 K/UL
BASOPHILS NFR BLD AUTO: 1.4 %
BILIRUB SERPL-MCNC: 1.4 MG/DL
BUN SERPL-MCNC: 15 MG/DL
CALCIUM SERPL-MCNC: 9.4 MG/DL
CHLORIDE SERPL-SCNC: 105 MMOL/L
CHOLEST SERPL-MCNC: 151 MG/DL
CO2 SERPL-SCNC: 23 MMOL/L
CREAT SERPL-MCNC: 1.26 MG/DL
EGFR: 69 ML/MIN/1.73M2
EOSINOPHIL # BLD AUTO: 0.13 K/UL
EOSINOPHIL NFR BLD AUTO: 2.6 %
ESTIMATED AVERAGE GLUCOSE: 117 MG/DL
GLUCOSE SERPL-MCNC: 104 MG/DL
HBA1C MFR BLD HPLC: 5.7 %
HCT VFR BLD CALC: 47.5 %
HDLC SERPL-MCNC: 45 MG/DL
HGB BLD-MCNC: 16 G/DL
IMM GRANULOCYTES NFR BLD AUTO: 0.2 %
LDLC SERPL CALC-MCNC: 86 MG/DL
LYMPHOCYTES # BLD AUTO: 1.4 K/UL
LYMPHOCYTES NFR BLD AUTO: 27.6 %
MAN DIFF?: NORMAL
MCHC RBC-ENTMCNC: 29.6 PG
MCHC RBC-ENTMCNC: 33.7 GM/DL
MCV RBC AUTO: 88 FL
MONOCYTES # BLD AUTO: 0.42 K/UL
MONOCYTES NFR BLD AUTO: 8.3 %
NEUTROPHILS # BLD AUTO: 3.05 K/UL
NEUTROPHILS NFR BLD AUTO: 59.9 %
NONHDLC SERPL-MCNC: 107 MG/DL
PLATELET # BLD AUTO: 173 K/UL
POTASSIUM SERPL-SCNC: 3.9 MMOL/L
PROT SERPL-MCNC: 6.9 G/DL
RBC # BLD: 5.4 M/UL
RBC # FLD: 12.4 %
SODIUM SERPL-SCNC: 141 MMOL/L
TRIGL SERPL-MCNC: 102 MG/DL
WBC # FLD AUTO: 5.08 K/UL

## 2023-06-07 RX ORDER — AZITHROMYCIN 250 MG/1
250 TABLET, FILM COATED ORAL
Qty: 1 | Refills: 0 | Status: COMPLETED | COMMUNITY
Start: 2023-02-03 | End: 2023-06-07

## 2023-06-07 RX ORDER — PREDNISONE 10 MG/1
10 TABLET ORAL
Qty: 18 | Refills: 0 | Status: COMPLETED | COMMUNITY
Start: 2023-02-03 | End: 2023-06-07

## 2023-06-08 ENCOUNTER — APPOINTMENT (OUTPATIENT)
Dept: PULMONOLOGY | Facility: CLINIC | Age: 53
End: 2023-06-08
Payer: COMMERCIAL

## 2023-06-08 ENCOUNTER — LABORATORY RESULT (OUTPATIENT)
Age: 53
End: 2023-06-08

## 2023-06-08 VITALS
OXYGEN SATURATION: 97 % | HEART RATE: 79 BPM | SYSTOLIC BLOOD PRESSURE: 110 MMHG | WEIGHT: 195 LBS | BODY MASS INDEX: 31.34 KG/M2 | HEIGHT: 66 IN | DIASTOLIC BLOOD PRESSURE: 76 MMHG

## 2023-06-08 DIAGNOSIS — J45.909 UNSPECIFIED ASTHMA, UNCOMPLICATED: ICD-10-CM

## 2023-06-08 DIAGNOSIS — E78.00 PURE HYPERCHOLESTEROLEMIA, UNSPECIFIED: ICD-10-CM

## 2023-06-08 DIAGNOSIS — R21 RASH AND OTHER NONSPECIFIC SKIN ERUPTION: ICD-10-CM

## 2023-06-08 PROCEDURE — 99214 OFFICE O/P EST MOD 30 MIN: CPT | Mod: 25

## 2023-06-08 PROCEDURE — 36415 COLL VENOUS BLD VENIPUNCTURE: CPT

## 2023-06-08 RX ORDER — ERGOCALCIFEROL 1.25 MG/1
1.25 MG CAPSULE, LIQUID FILLED ORAL
Qty: 12 | Refills: 3 | Status: ACTIVE | COMMUNITY
Start: 2022-04-03 | End: 1900-01-01

## 2023-06-08 NOTE — REASON FOR VISIT
[Follow-Up] : a follow-up visit [Abnormal CXR/ Chest CT] : an abnormal CXR/ chest CT [Sleep Apnea] : sleep apnea [Cough] : cough [Hypersomnolence] : hypersomnolence

## 2023-06-10 NOTE — HISTORY OF PRESENT ILLNESS
[TextBox_4] : Complains of right ankle skin rash [FreeTextEntry1] : Follow up [de-identified] : CESAR SHERWOOD is a 52 year old male, with history of history of obesity, fatty liver (VAZQUEZ), who presents to the office for follow up evaluation. Patient reports that he is feeling well overall with no complaints. He reports of having an intermittent dry cough that started 3 months ago. Patient reports that his symptoms are accompanied by chest congestion, and hemoptysis. He denies of any bowel movement or urination problems. He states that he used to take Atorvastatin for elevated cholesterol levels but has stopped due to difficulties taking it. Patient denies of any smoking history. He also states of having symptoms of excessive daytime sleepiness/snoring. Patient reports that he has never received a home sleep study.

## 2023-06-10 NOTE — PLAN
[FreeTextEntry1] : Discussed with patient at length regarding the aforementioned sleep study findings and all treatment options in the office today, will start patient on APAP(Auto-titrating positive airway pressure) at 5-20 cm H2O via nasal mask at this point.\par Start Flonase nasal spray for postnasal drip.\par Venipuncture with labs drawn in office.\par Advised patient to visit GI to follow up on his VAZQUEZ and abdominal pain.\par Advised patient to visit Dr. Venkat James further Urology evaluation.\par Continue home blood pressure log for monitoring.\par Start clotrimazole betamethasone cream for skin rash.\par Continue Atorvastatin 10 mg for elevated cholesterol levels/hypercholesterolemia.\par Advised patient to fast for at least 5 hours prior to next visit for accurate lipid level.\par Return for follow up 1 month of home sleepy study, chest CT scan, and updated lab results.

## 2023-06-10 NOTE — DISCUSSION/SUMMARY
[FreeTextEntry1] : Hypercholesterolemia.  Severe obstructive sleep apnea.  Glucose intolerance.  Asthmatic bronchitis from history of cigarette smoking.

## 2023-06-11 LAB
ALBUMIN SERPL ELPH-MCNC: 4.6 G/DL
ALP BLD-CCNC: 100 U/L
ALT SERPL-CCNC: 29 U/L
ANION GAP SERPL CALC-SCNC: 14 MMOL/L
AST SERPL-CCNC: 24 U/L
BILIRUB SERPL-MCNC: 0.8 MG/DL
BUN SERPL-MCNC: 14 MG/DL
CALCIUM SERPL-MCNC: 9.1 MG/DL
CHLORIDE SERPL-SCNC: 108 MMOL/L
CHOLEST SERPL-MCNC: 159 MG/DL
CK SERPL-CCNC: 115 U/L
CO2 SERPL-SCNC: 21 MMOL/L
CREAT SERPL-MCNC: 1.13 MG/DL
EGFR: 78 ML/MIN/1.73M2
GLUCOSE SERPL-MCNC: 103 MG/DL
HDLC SERPL-MCNC: 42 MG/DL
LDLC SERPL CALC-MCNC: 92 MG/DL
NONHDLC SERPL-MCNC: 117 MG/DL
POTASSIUM SERPL-SCNC: 4.5 MMOL/L
PROT SERPL-MCNC: 6.9 G/DL
SODIUM SERPL-SCNC: 143 MMOL/L
TRIGL SERPL-MCNC: 123 MG/DL

## 2023-06-13 ENCOUNTER — APPOINTMENT (OUTPATIENT)
Dept: CARDIOLOGY | Facility: CLINIC | Age: 53
End: 2023-06-13
Payer: COMMERCIAL

## 2023-06-13 ENCOUNTER — NON-APPOINTMENT (OUTPATIENT)
Age: 53
End: 2023-06-13

## 2023-06-13 VITALS
OXYGEN SATURATION: 98 % | DIASTOLIC BLOOD PRESSURE: 60 MMHG | BODY MASS INDEX: 31.18 KG/M2 | RESPIRATION RATE: 14 BRPM | HEIGHT: 66 IN | WEIGHT: 194 LBS | HEART RATE: 82 BPM | SYSTOLIC BLOOD PRESSURE: 110 MMHG

## 2023-06-13 VITALS
HEART RATE: 82 BPM | DIASTOLIC BLOOD PRESSURE: 60 MMHG | WEIGHT: 194 LBS | SYSTOLIC BLOOD PRESSURE: 110 MMHG | OXYGEN SATURATION: 98 % | BODY MASS INDEX: 31.31 KG/M2

## 2023-06-13 DIAGNOSIS — Z82.3 FAMILY HISTORY OF STROKE: ICD-10-CM

## 2023-06-13 DIAGNOSIS — Z13.6 ENCOUNTER FOR SCREENING FOR CARDIOVASCULAR DISORDERS: ICD-10-CM

## 2023-06-13 PROCEDURE — 99204 OFFICE O/P NEW MOD 45 MIN: CPT | Mod: 25

## 2023-06-13 PROCEDURE — 93000 ELECTROCARDIOGRAM COMPLETE: CPT

## 2023-06-13 NOTE — REVIEW OF SYSTEMS
[Weight Loss (___ Lbs)] : no recent weight loss [SOB] : no shortness of breath [Chest Discomfort] : no chest discomfort [Palpitations] : no palpitations [Negative] : Heme/Lymph

## 2023-06-13 NOTE — PHYSICAL EXAM
[No Acute Distress] : no acute distress [Obese] : obese [Normal S1, S2] : normal S1, S2 [Clear Lung Fields] : clear lung fields [Soft] : abdomen soft [Normal Bowel Sounds] : normal bowel sounds [Normal Gait] : normal gait [Gait - Sufficient for Exercise Testing] : gait - sufficient for exercise testing [No Edema] : no edema [Normal Speech] : normal speech [Alert and Oriented] : alert and oriented [de-identified] :   Pupils are round [de-identified] :  normocephalic [de-identified] :  no carotid bruit [de-identified] :  warm and dry

## 2023-06-13 NOTE — DISCUSSION/SUMMARY
[FreeTextEntry1] : \par Hyperlipidemia: Significant improvement in cholesterol with atorvastatin 10 mg daily–now satisfactorily controlled;  continue present therapy and initiate therapeutic lifestyle modifications.\par \par Screening for heart disease: Normal resting ECG; I recommend an exercise ECG stress test prior to initiation of a formal exercise regimen --   we discussed a goal of 150-minute minimal amount of moderately intense exercise per week;  I suggested diet and weight loss.\par \par   Obstructive sleep apnea: Severe; we discussed cardiac complications of sleep apnea and I stressed the importance of adherence with treatment as outlined by his pulmonologist.\par

## 2023-06-13 NOTE — HISTORY OF PRESENT ILLNESS
[FreeTextEntry1] : Dalton Briseno is a 53-year-old man with a history of obesity,  hypercholesterolemia, fatty liver disease, elevated blood pressure, obstructive sleep apnea (severe), who presents for cardiology consultation.  He has no past history of heart disease but is concerned about risk factors and requests screening.  He describes some exercise but is sedentary.  He has not been experiencing angina, dyspnea, or palpitations.  He is hoping to increase his activity level in an effort to lose weight and improve overall health.  There is no family history of premature coronary artery disease or congestive heart failure.  He has been taking atorvastatin for his hyperlipidemia and cholesterol has markedly improved.

## 2023-06-28 ENCOUNTER — APPOINTMENT (OUTPATIENT)
Dept: CARDIOLOGY | Facility: CLINIC | Age: 53
End: 2023-06-28
Payer: COMMERCIAL

## 2023-06-28 PROCEDURE — 93015 CV STRESS TEST SUPVJ I&R: CPT

## 2023-06-29 ENCOUNTER — NON-APPOINTMENT (OUTPATIENT)
Age: 53
End: 2023-06-29

## 2023-09-07 ENCOUNTER — APPOINTMENT (OUTPATIENT)
Dept: PULMONOLOGY | Facility: CLINIC | Age: 53
End: 2023-09-07

## 2023-12-05 NOTE — DISCUSSION/SUMMARY
Male
[de-identified] : At this time, due to patellofemoral arthritis of the left knee, I recommended a course of physical therapy, topical anti-inflammatory cream and reassessment in 3-4 weeks.\par

## 2024-01-23 ENCOUNTER — OUTPATIENT (OUTPATIENT)
Dept: OUTPATIENT SERVICES | Facility: HOSPITAL | Age: 54
LOS: 1 days | End: 2024-01-23
Payer: COMMERCIAL

## 2024-01-23 ENCOUNTER — APPOINTMENT (OUTPATIENT)
Dept: ULTRASOUND IMAGING | Facility: CLINIC | Age: 54
End: 2024-01-23
Payer: COMMERCIAL

## 2024-01-23 DIAGNOSIS — Z00.8 ENCOUNTER FOR OTHER GENERAL EXAMINATION: ICD-10-CM

## 2024-01-23 DIAGNOSIS — R14.0 ABDOMINAL DISTENSION (GASEOUS): ICD-10-CM

## 2024-01-23 DIAGNOSIS — R10.11 RIGHT UPPER QUADRANT PAIN: ICD-10-CM

## 2024-01-23 PROCEDURE — 76700 US EXAM ABDOM COMPLETE: CPT | Mod: 26

## 2024-01-23 PROCEDURE — 76700 US EXAM ABDOM COMPLETE: CPT

## 2024-02-02 ENCOUNTER — LABORATORY RESULT (OUTPATIENT)
Age: 54
End: 2024-02-02

## 2024-02-04 LAB
25(OH)D3 SERPL-MCNC: 40.3 NG/ML
ALBUMIN SERPL ELPH-MCNC: 4.6 G/DL
ALP BLD-CCNC: 89 U/L
ALT SERPL-CCNC: 30 U/L
ANION GAP SERPL CALC-SCNC: 12 MMOL/L
AST SERPL-CCNC: 21 U/L
BASOPHILS # BLD AUTO: 0.06 K/UL
BASOPHILS NFR BLD AUTO: 1 %
BILIRUB SERPL-MCNC: 1.2 MG/DL
BUN SERPL-MCNC: 13 MG/DL
CALCIUM SERPL-MCNC: 8.6 MG/DL
CHLORIDE SERPL-SCNC: 106 MMOL/L
CHOLEST SERPL-MCNC: 143 MG/DL
CO2 SERPL-SCNC: 23 MMOL/L
CREAT SERPL-MCNC: 1.17 MG/DL
EGFR: 75 ML/MIN/1.73M2
EOSINOPHIL # BLD AUTO: 0.14 K/UL
EOSINOPHIL NFR BLD AUTO: 2.3 %
ESTIMATED AVERAGE GLUCOSE: 114 MG/DL
GLUCOSE SERPL-MCNC: 104 MG/DL
HBA1C MFR BLD HPLC: 5.6 %
HCT VFR BLD CALC: 45.5 %
HCV AB SER QL: NONREACTIVE
HCV S/CO RATIO: 0.17 S/CO
HDLC SERPL-MCNC: 42 MG/DL
HGB BLD-MCNC: 15.6 G/DL
IMM GRANULOCYTES NFR BLD AUTO: 0.3 %
LDLC SERPL CALC-MCNC: 79 MG/DL
LYMPHOCYTES # BLD AUTO: 2.13 K/UL
LYMPHOCYTES NFR BLD AUTO: 35.7 %
MAGNESIUM SERPL-MCNC: 2.2 MG/DL
MAN DIFF?: NORMAL
MCHC RBC-ENTMCNC: 29.2 PG
MCHC RBC-ENTMCNC: 34.3 GM/DL
MCV RBC AUTO: 85 FL
MONOCYTES # BLD AUTO: 0.38 K/UL
MONOCYTES NFR BLD AUTO: 6.4 %
NEUTROPHILS # BLD AUTO: 3.24 K/UL
NEUTROPHILS NFR BLD AUTO: 54.3 %
NONHDLC SERPL-MCNC: 100 MG/DL
PHOSPHATE SERPL-MCNC: 3.2 MG/DL
PLATELET # BLD AUTO: 164 K/UL
POTASSIUM SERPL-SCNC: 4.1 MMOL/L
PROT SERPL-MCNC: 6.6 G/DL
PSA SERPL-MCNC: 0.39 NG/ML
RBC # BLD: 5.35 M/UL
RBC # FLD: 12.1 %
SODIUM SERPL-SCNC: 141 MMOL/L
T3 SERPL-MCNC: 90 NG/DL
T3RU NFR SERPL: 0.9 TBI
T4 FREE SERPL-MCNC: 1.6 NG/DL
T4 SERPL-MCNC: 5.2 UG/DL
TRIGL SERPL-MCNC: 118 MG/DL
TSH SERPL-ACNC: 1.44 UIU/ML
WBC # FLD AUTO: 5.97 K/UL

## 2024-02-08 ENCOUNTER — APPOINTMENT (OUTPATIENT)
Dept: PULMONOLOGY | Facility: CLINIC | Age: 54
End: 2024-02-08
Payer: COMMERCIAL

## 2024-02-08 VITALS — HEIGHT: 66 IN

## 2024-02-08 VITALS — DIASTOLIC BLOOD PRESSURE: 77 MMHG | SYSTOLIC BLOOD PRESSURE: 129 MMHG | OXYGEN SATURATION: 96 % | HEART RATE: 80 BPM

## 2024-02-08 VITALS — WEIGHT: 196 LBS | BODY MASS INDEX: 31.64 KG/M2

## 2024-02-08 DIAGNOSIS — Z00.00 ENCOUNTER FOR GENERAL ADULT MEDICAL EXAMINATION W/OUT ABNORMAL FINDINGS: ICD-10-CM

## 2024-02-08 DIAGNOSIS — R09.82 POSTNASAL DRIP: ICD-10-CM

## 2024-02-08 DIAGNOSIS — E74.39 OTHER DISORDERS OF INTESTINAL CARBOHYDRATE ABSORPTION: ICD-10-CM

## 2024-02-08 DIAGNOSIS — R10.13 EPIGASTRIC PAIN: ICD-10-CM

## 2024-02-08 DIAGNOSIS — R05.9 COUGH, UNSPECIFIED: ICD-10-CM

## 2024-02-08 DIAGNOSIS — E55.9 VITAMIN D DEFICIENCY, UNSPECIFIED: ICD-10-CM

## 2024-02-08 DIAGNOSIS — R31.29 OTHER MICROSCOPIC HEMATURIA: ICD-10-CM

## 2024-02-08 PROCEDURE — 82044 UR ALBUMIN SEMIQUANTITATIVE: CPT | Mod: QW

## 2024-02-08 PROCEDURE — 99396 PREV VISIT EST AGE 40-64: CPT

## 2024-02-08 PROCEDURE — 81003 URINALYSIS AUTO W/O SCOPE: CPT | Mod: QW

## 2024-02-08 RX ORDER — PREDNISONE 10 MG/1
10 TABLET ORAL
Qty: 12 | Refills: 0 | Status: ACTIVE | COMMUNITY
Start: 2024-02-08 | End: 1900-01-01

## 2024-02-08 RX ORDER — AZITHROMYCIN 250 MG/1
250 TABLET, FILM COATED ORAL
Qty: 1 | Refills: 0 | Status: ACTIVE | COMMUNITY
Start: 2024-02-08 | End: 1900-01-01

## 2024-02-08 RX ORDER — FLUTICASONE PROPIONATE 50 UG/1
50 SPRAY, METERED NASAL
Qty: 1 | Refills: 5 | Status: ACTIVE | COMMUNITY
Start: 2024-02-08 | End: 1900-01-01

## 2024-02-10 NOTE — PLAN
[FreeTextEntry1] : Discussed with patient at length regarding the aforementioned sleep study findings and all treatment options in the office today, will start patient on APAP(Auto-titrating positive airway pressure) at 5-20 cm H2O via face mask at this point.  Get abdominal/pelvic CT scan to further evaluate epigastric pain.  Start Z-Edgard and prednisone taper for postnasal drip and cough Start Flonase nasal spray for postnasal drip. Venipuncture with labs drawn in office Age-appropriate counseling and preventive care screening discussed. Urine sent for further analysis for microscopic hematuria.   Medications reviewed. Continue present medications.

## 2024-02-10 NOTE — HISTORY OF PRESENT ILLNESS
[FreeTextEntry1] : Here for annual physical examination today. [de-identified] : Dalton is a pleasant 53-year-old gentleman with history of hepatic steatosis, recently diagnosed severe obstructive sleep apnea, obesity, he came in for annual physical examination today, complains of epigastric pain.  Also complains of nasal congestion and cough.

## 2024-02-10 NOTE — DATA REVIEWED
[FreeTextEntry1] : EXAM: [id number] - US ABDOMEN COMPLETE - ORDERED BY: PAULO DOVE   PROCEDURE DATE: 01/23/2024    INTERPRETATION: CLINICAL INFORMATION: RIGHT upper quadrant pain  COMPARISON: November 2020, CT 4/14/2022  TECHNIQUE: Sonography of the abdomen. Exam limited by intestinal bowel gas  FINDINGS: Liver: Moderate hepatic steatosis Bile ducts: Normal caliber. Common bile duct measures 5 mm. Gallbladder: Within normal limits. Pancreas: Largely obscured Spleen: 9.9 cm. Within normal limits. Right kidney: 9.0 cm. No hydronephrosis. Subcentimeter sized lower pole cyst Left kidney: 9.7 cm. No hydronephrosis. 19 x 17 mm lower pole cyst Ascites: None. Aorta and IVC: Visualized portions are within normal limits.  IMPRESSION: Moderate hepatic steatosis.    --- End of Report ---       JARON BAPTISTE MD; Attending Radiologist This document has been electronically signed. Jan 24 2024 5:56PM

## 2024-02-13 LAB
ALBUMIN: 30
APPEARANCE: CLEAR
BACTERIA UR CULT: NORMAL
BACTERIA: NEGATIVE /HPF
BILIRUB UR QL STRIP: NORMAL
BILIRUBIN URINE: NEGATIVE
BLOOD URINE: NEGATIVE
CAST: 0 /LPF
CLARITY UR: CLEAR
COLLECTION METHOD: NORMAL
COLOR: YELLOW
CREAT SPEC-SCNC: 152 MG/DL
CREATININE: 100
EPITHELIAL CELLS: 0 /HPF
GLUCOSE QUALITATIVE U: NEGATIVE MG/DL
GLUCOSE UR-MCNC: NORMAL
HCG UR QL: 0.2 EU/DL
HGB UR QL STRIP.AUTO: NORMAL
KETONES UR-MCNC: NORMAL
KETONES URINE: NEGATIVE MG/DL
LEUKOCYTE ESTERASE UR QL STRIP: NORMAL
LEUKOCYTE ESTERASE URINE: NEGATIVE
MICROALBUMIN 24H UR DL<=1MG/L-MCNC: <1.2 MG/DL
MICROALBUMIN/CREAT 24H UR-RTO: NORMAL MG/G
MICROALBUMIN/CREAT UR TEST STR-RTO: 30
MICROSCOPIC-UA: NORMAL
NITRITE UR QL STRIP: NORMAL
NITRITE URINE: NEGATIVE
PH UR STRIP: 5.5
PH URINE: 5.5
PROT UR STRIP-MCNC: NORMAL
PROTEIN URINE: NEGATIVE MG/DL
RED BLOOD CELLS URINE: 0 /HPF
SP GR UR STRIP: 1.02
SPECIFIC GRAVITY URINE: 1.02
UROBILINOGEN URINE: 0.2 MG/DL
WHITE BLOOD CELLS URINE: 0 /HPF

## 2024-03-20 ENCOUNTER — RX RENEWAL (OUTPATIENT)
Age: 54
End: 2024-03-20

## 2024-03-20 RX ORDER — ATORVASTATIN CALCIUM 10 MG/1
10 TABLET, FILM COATED ORAL
Qty: 90 | Refills: 3 | Status: ACTIVE | COMMUNITY
Start: 2022-04-03 | End: 1900-01-01

## 2024-04-13 ENCOUNTER — OUTPATIENT (OUTPATIENT)
Dept: OUTPATIENT SERVICES | Facility: HOSPITAL | Age: 54
LOS: 1 days | End: 2024-04-13
Payer: COMMERCIAL

## 2024-04-13 ENCOUNTER — APPOINTMENT (OUTPATIENT)
Dept: CT IMAGING | Facility: CLINIC | Age: 54
End: 2024-04-13
Payer: COMMERCIAL

## 2024-04-13 DIAGNOSIS — R10.13 EPIGASTRIC PAIN: ICD-10-CM

## 2024-04-13 DIAGNOSIS — Z00.8 ENCOUNTER FOR OTHER GENERAL EXAMINATION: ICD-10-CM

## 2024-04-13 PROCEDURE — 74177 CT ABD & PELVIS W/CONTRAST: CPT

## 2024-04-13 PROCEDURE — 74177 CT ABD & PELVIS W/CONTRAST: CPT | Mod: 26

## 2024-05-02 ENCOUNTER — APPOINTMENT (OUTPATIENT)
Dept: PULMONOLOGY | Facility: CLINIC | Age: 54
End: 2024-05-02
Payer: COMMERCIAL

## 2024-05-02 VITALS — DIASTOLIC BLOOD PRESSURE: 91 MMHG | OXYGEN SATURATION: 96 % | SYSTOLIC BLOOD PRESSURE: 146 MMHG | HEART RATE: 86 BPM

## 2024-05-02 DIAGNOSIS — R06.83 SNORING: ICD-10-CM

## 2024-05-02 DIAGNOSIS — K75.81 NONALCOHOLIC STEATOHEPATITIS (NASH): ICD-10-CM

## 2024-05-02 DIAGNOSIS — E78.5 HYPERLIPIDEMIA, UNSPECIFIED: ICD-10-CM

## 2024-05-02 DIAGNOSIS — G47.33 OBSTRUCTIVE SLEEP APNEA (ADULT) (PEDIATRIC): ICD-10-CM

## 2024-05-02 DIAGNOSIS — E66.9 OBESITY, UNSPECIFIED: ICD-10-CM

## 2024-05-02 PROCEDURE — 99214 OFFICE O/P EST MOD 30 MIN: CPT

## 2024-05-02 NOTE — HISTORY OF PRESENT ILLNESS
[FreeTextEntry1] : Here for annual physical examination today. [de-identified] : Dalton is a pleasant 53-year-old gentleman with history of hepatic steatosis, recently diagnosed severe obstructive sleep apnea, obesity, he came in for annual physical examination today, complains of epigastric pain.  Also complains of nasal congestion and cough.

## 2024-05-02 NOTE — PLAN
[FreeTextEntry1] : Auto-titrating Positive Airway Pressure(APAP) compliance reviewed with patient in office today. Patient is compliant and benefiting from APAP usage. Continue atorvastatin 10 mg p.o. daily for hypercholesterolemia. Strongly advised patient on dieting, exercise and weight loss. Medications reviewed. Continue present medications. Return for office follow-up in 3 months.

## 2024-05-02 NOTE — DATA REVIEWED
[FreeTextEntry1] : EXAM: [id number] - US ABDOMEN COMPLETE - ORDERED BY: PAULO DOVE   PROCEDURE DATE: 01/23/2024    INTERPRETATION: CLINICAL INFORMATION: RIGHT upper quadrant pain  COMPARISON: November 2020, CT 4/14/2022  TECHNIQUE: Sonography of the abdomen. Exam limited by intestinal bowel gas  FINDINGS: Liver: Moderate hepatic steatosis Bile ducts: Normal caliber. Common bile duct measures 5 mm. Gallbladder: Within normal limits. Pancreas: Largely obscured Spleen: 9.9 cm. Within normal limits. Right kidney: 9.0 cm. No hydronephrosis. Subcentimeter sized lower pole cyst Left kidney: 9.7 cm. No hydronephrosis. 19 x 17 mm lower pole cyst Ascites: None. Aorta and IVC: Visualized portions are within normal limits.  IMPRESSION: Moderate hepatic steatosis.    --- End of Report ---       JARON BAPTISTE MD; Attending Radiologist This document has been electronically signed. Jan 24 2024 5:56PM

## 2024-05-02 NOTE — PROCEDURE
[FreeTextEntry1] :  CT Abdomen and Pelvis w/ IV Cont             Final  No Documents Attached    	 EXAM: [id number] - CT ABDOMEN AND PELVIS IC  - ORDERED BY: VIRGIL HATCH   PROCEDURE DATE:  04/13/2024    INTERPRETATION:  CLINICAL INFORMATION: Epigastric pain. Rule out pancreatitis.  COMPARISON: CT abdomen and pelvis dated 4/14/2022.  CONTRAST/COMPLICATIONS: IV Contrast: Omnipaque 350  90 cc administered   10 cc discarded Oral Contrast: Water Complications: None reported at time of study completion  PROCEDURE: CT of the Abdomen and Pelvis was performed. Sagittal and coronal reformats were performed.  FINDINGS: LOWER CHEST: Within normal limits.  LIVER: Hepatic steatosis. Stable 1.3 cm left hepatic cyst. Right hepatic lobe nonenhancing hypodensity too small to characterize however likely represent a hepatic cyst. BILE DUCTS: There is mild dilation of the distal common bile duct to 1.0 cm, grossly unchanged from prior CT chest. GALLBLADDER: Within normal limits. SPLEEN: Within normal limits. PANCREAS: The pancreatic parenchyma is unremarkable. No peripancreatic fat stranding. ADRENALS: Within normal limits. KIDNEYS/URETERS: Symmetric enhancement. No hydronephrosis or perinephric stranding. Bilateral renal cysts.  BLADDER: Within normal limits. REPRODUCTIVE ORGANS: Prostate within normal limits.  BOWEL:  No bowel obstruction. Appendix is normal. PERITONEUM: No ascites or pneumoperitoneum. VESSELS: Atherosclerotic disease of the aorta. RETROPERITONEUM/LYMPH NODES: No lymphadenopathy. ABDOMINAL WALL: Small fat-containing umbilical hernia. BONES: Degenerative changes.  IMPRESSION:  No evidence of acute intra-abdominal pathology.    --- End of Report ---      SAUNDRA FELIX DO; Resident Radiologist This document has been electronically signed. SARAN ACOSTA MD; Attending Radiologist This document has been electronically signed. Apr 14 2024  6:11PM  Ordered by: VIRGIL HATCH       Collected/Examined: 13Apr2024 08:50AM       Verification Required       Stage: Final       Performed at: [facility] [place]       Resulted: 13Apr2024 11:05AM       Last Updated: 14Apr2024 06:14PM       Accession: [id number]

## 2024-08-01 ENCOUNTER — LABORATORY RESULT (OUTPATIENT)
Age: 54
End: 2024-08-01

## 2024-08-01 ENCOUNTER — APPOINTMENT (OUTPATIENT)
Dept: PULMONOLOGY | Facility: CLINIC | Age: 54
End: 2024-08-01
Payer: COMMERCIAL

## 2024-08-01 VITALS — SYSTOLIC BLOOD PRESSURE: 112 MMHG | HEART RATE: 69 BPM | OXYGEN SATURATION: 97 % | DIASTOLIC BLOOD PRESSURE: 77 MMHG

## 2024-08-01 DIAGNOSIS — R06.83 SNORING: ICD-10-CM

## 2024-08-01 DIAGNOSIS — G47.33 OBSTRUCTIVE SLEEP APNEA (ADULT) (PEDIATRIC): ICD-10-CM

## 2024-08-01 DIAGNOSIS — E74.39 OTHER DISORDERS OF INTESTINAL CARBOHYDRATE ABSORPTION: ICD-10-CM

## 2024-08-01 DIAGNOSIS — E78.00 PURE HYPERCHOLESTEROLEMIA, UNSPECIFIED: ICD-10-CM

## 2024-08-01 PROCEDURE — 99214 OFFICE O/P EST MOD 30 MIN: CPT

## 2024-08-01 PROCEDURE — 36415 COLL VENOUS BLD VENIPUNCTURE: CPT

## 2024-08-01 NOTE — DATA REVIEWED
[FreeTextEntry1] : EXAM: [id number] - US ABDOMEN COMPLETE - ORDERED BY: PAULO DOVE   PROCEDURE DATE: 01/23/2024    INTERPRETATION: CLINICAL INFORMATION: RIGHT upper quadrant pain  COMPARISON: November 2020, CT 4/14/2022  TECHNIQUE: Sonography of the abdomen. Exam limited by intestinal bowel gas  FINDINGS: Liver: Moderate hepatic steatosis Bile ducts: Normal caliber. Common bile duct measures 5 mm. Gallbladder: Within normal limits. Pancreas: Largely obscured Spleen: 9.9 cm. Within normal limits. Right kidney: 9.0 cm. No hydronephrosis. Subcentimeter sized lower pole cyst Left kidney: 9.7 cm. No hydronephrosis. 19 x 17 mm lower pole cyst Ascites: None. Aorta and IVC: Visualized portions are within normal limits.  IMPRESSION: Moderate hepatic steatosis.    --- End of Report ---       JARON BAPTISTE MD; Attending Radiologist This document has been electronically signed. Jan 24 2024 5:56PM

## 2024-08-01 NOTE — PROCEDURE
[FreeTextEntry1] :  CT Abdomen and Pelvis w/ IV Cont             Final  No Documents Attached    	 EXAM: [id number] - CT ABDOMEN AND PELVIS IC  - ORDERED BY: VIRGIL HATCH   PROCEDURE DATE:  04/13/2024    INTERPRETATION:  CLINICAL INFORMATION: Epigastric pain. Rule out pancreatitis.  COMPARISON: CT abdomen and pelvis dated 4/14/2022.  CONTRAST/COMPLICATIONS: IV Contrast: Omnipaque 350  90 cc administered   10 cc discarded Oral Contrast: Water Complications: None reported at time of study completion  PROCEDURE: CT of the Abdomen and Pelvis was performed. Sagittal and coronal reformats were performed.  FINDINGS: LOWER CHEST: Within normal limits.  LIVER: Hepatic steatosis. Stable 1.3 cm left hepatic cyst. Right hepatic lobe nonenhancing hypodensity too small to characterize however likely represent a hepatic cyst. BILE DUCTS: There is mild dilation of the distal common bile duct to 1.0 cm, grossly unchanged from prior CT chest. GALLBLADDER: Within normal limits. SPLEEN: Within normal limits. PANCREAS: The pancreatic parenchyma is unremarkable. No peripancreatic fat stranding. ADRENALS: Within normal limits. KIDNEYS/URETERS: Symmetric enhancement. No hydronephrosis or perinephric stranding. Bilateral renal cysts.  BLADDER: Within normal limits. REPRODUCTIVE ORGANS: Prostate within normal limits.  BOWEL:  No bowel obstruction. Appendix is normal. PERITONEUM: No ascites or pneumoperitoneum. VESSELS: Atherosclerotic disease of the aorta. RETROPERITONEUM/LYMPH NODES: No lymphadenopathy. ABDOMINAL WALL: Small fat-containing umbilical hernia. BONES: Degenerative changes.  IMPRESSION:  No evidence of acute intra-abdominal pathology.    --- End of Report ---      SAUNDRA FELIX DO; Resident Radiologist This document has been electronically signed. SARAN ACOSTA MD; Attending Radiologist This document has been electronically signed. Apr 14 2024  6:11PM  Ordered by: VIRGIL HATCH       Collected/Examined: 13Apr2024 08:50AM       Verification Required       Stage: Final       Performed at: [facility] [place]       Resulted: 13Apr2024 11:05AM       Last Updated: 14Apr2024 06:14PM       Accession: [id number]

## 2024-08-01 NOTE — HISTORY OF PRESENT ILLNESS
[FreeTextEntry1] : Here for annual physical examination today. [de-identified] : Dalton is a pleasant 53-year-old gentleman with history of hepatic steatosis, recently diagnosed severe obstructive sleep apnea, obesity, he came in for today.  He states that he recently started using APAP due to discomfort with nasal mask.

## 2024-08-04 LAB
ALBUMIN SERPL ELPH-MCNC: 4.7 G/DL
ALP BLD-CCNC: 101 U/L
ALT SERPL-CCNC: 33 U/L
ANION GAP SERPL CALC-SCNC: 13 MMOL/L
AST SERPL-CCNC: 26 U/L
BASOPHILS # BLD AUTO: 0.05 K/UL
BASOPHILS NFR BLD AUTO: 1 %
BILIRUB SERPL-MCNC: 1.6 MG/DL
BUN SERPL-MCNC: 13 MG/DL
CALCIUM SERPL-MCNC: 9.1 MG/DL
CHLORIDE SERPL-SCNC: 107 MMOL/L
CHOLEST SERPL-MCNC: 163 MG/DL
CO2 SERPL-SCNC: 23 MMOL/L
CREAT SERPL-MCNC: 1.15 MG/DL
EGFR: 76 ML/MIN/1.73M2
EOSINOPHIL # BLD AUTO: 0.22 K/UL
EOSINOPHIL NFR BLD AUTO: 4.5 %
ESTIMATED AVERAGE GLUCOSE: 117 MG/DL
GLUCOSE SERPL-MCNC: 86 MG/DL
HBA1C MFR BLD HPLC: 5.7 %
HCT VFR BLD CALC: 46.7 %
HDLC SERPL-MCNC: 44 MG/DL
HGB BLD-MCNC: 15.5 G/DL
IMM GRANULOCYTES NFR BLD AUTO: 0.2 %
LDLC SERPL CALC-MCNC: 86 MG/DL
LYMPHOCYTES # BLD AUTO: 1.5 K/UL
LYMPHOCYTES NFR BLD AUTO: 30.7 %
MAN DIFF?: NORMAL
MCHC RBC-ENTMCNC: 28.8 PG
MCHC RBC-ENTMCNC: 33.2 GM/DL
MCV RBC AUTO: 86.8 FL
MONOCYTES # BLD AUTO: 0.29 K/UL
MONOCYTES NFR BLD AUTO: 5.9 %
NEUTROPHILS # BLD AUTO: 2.82 K/UL
NEUTROPHILS NFR BLD AUTO: 57.7 %
NONHDLC SERPL-MCNC: 119 MG/DL
PLATELET # BLD AUTO: 152 K/UL
POTASSIUM SERPL-SCNC: 4.3 MMOL/L
PROT SERPL-MCNC: 6.8 G/DL
RBC # BLD: 5.38 M/UL
RBC # FLD: 12.5 %
SODIUM SERPL-SCNC: 143 MMOL/L
TRIGL SERPL-MCNC: 198 MG/DL
WBC # FLD AUTO: 4.89 K/UL

## 2024-08-20 ENCOUNTER — APPOINTMENT (OUTPATIENT)
Dept: NEUROLOGY | Facility: CLINIC | Age: 54
End: 2024-08-20
Payer: COMMERCIAL

## 2024-08-20 VITALS
SYSTOLIC BLOOD PRESSURE: 110 MMHG | HEIGHT: 66 IN | DIASTOLIC BLOOD PRESSURE: 80 MMHG | HEART RATE: 70 BPM | BODY MASS INDEX: 31.5 KG/M2 | WEIGHT: 196 LBS

## 2024-08-20 DIAGNOSIS — G43.701 CHRONIC MIGRAINE W/OUT AURA, NOT INTRACTABLE, WITH STATUS MIGRAINOSUS: ICD-10-CM

## 2024-08-20 DIAGNOSIS — Z82.0 FAMILY HISTORY OF EPILEPSY AND OTHER DISEASES OF THE NERVOUS SYSTEM: ICD-10-CM

## 2024-08-20 DIAGNOSIS — G43.109 MIGRAINE WITH AURA, NOT INTRACTABLE, W/OUT STATUS MIGRAINOSUS: ICD-10-CM

## 2024-08-20 PROCEDURE — 99204 OFFICE O/P NEW MOD 45 MIN: CPT

## 2024-08-20 PROCEDURE — G2211 COMPLEX E/M VISIT ADD ON: CPT

## 2024-08-20 RX ORDER — SUMATRIPTAN 100 MG/1
100 TABLET, FILM COATED ORAL
Qty: 12 | Refills: 6 | Status: ACTIVE | COMMUNITY
Start: 2024-08-20 | End: 1900-01-01

## 2024-08-20 NOTE — PHYSICAL EXAM
[FreeTextEntry1] : Head:  Normocephalic Neck: Supple nontender no carotid bruits.  Mental Status:  Alert Oriented X3 Speech normal and no aphasia or dysarthria.  Cranial Nerves:  PERRL, Visual Fields full  EOMI no diplopia no ptosis no nystagmus, V through XII intact.  Motor:  No drift, normal strength tone and coordination and no focal atrophy. No abnormal movements. No dysmetria.  Normal rapid alternating movements.   DTRs: Symmetric and 2+.  Plantars flexor.  No Clonus.  Sensory:  Normal testing with pin light touch and vibration and  Joint position sense.  Normal DSS to touch.  Gait:  Normal including tandem walking heel toe walking and Rhomberg.

## 2024-08-20 NOTE — ASSESSMENT
[FreeTextEntry1] : Impression: This 54-year-old male patient right-handed has a chronic history of migraine headache disorder with a binocular visual aura dating back approximately 10 years.  He has a family history in his brother.  He has followed with neurology elsewhere and has been taking rizatriptan 10 mg ODT with variable results.  He has not taken other medications.  Neurological exam is normal.  Recommendations: Discontinue rizatriptan to begin a trial of sumatriptan 100 mg to be taken at an onset and repeat in 2 hours as needed with Aleve/naproxen 2 tablets as directed.  Consider CGRP medication such as Ubrelvy or Nurtec depending on his response to the second triptan medication.  MRI of the brain.  He was encouraged to use his CPAP machine for sleep apnea.  Office follow-up in 3 months.

## 2024-08-20 NOTE — HISTORY OF PRESENT ILLNESS
[FreeTextEntry1] : This patient presents for an office consultation.  He is a 54-year-old right-handed male who has a chronic history for at least the last 10 years of migraine headache disorder described as a binocular blurring of vision followed by a headache which is generalized but predominantly posterior cranial in location.  The headache can be severe and dull and there is nausea but he does not describe vomiting.  The visual complaints tend to resolve when the headache begins.  He has headache on the average of once a month. There are no definite triggers and a headache can occur at any time including in the a.m. on awakening.  He did have a negative brain CAT scan with some mild sinus changes on 4/20/2022 and he recalls having had other imaging years ago.  He has followed with another neurologist for the last several years with the only medication prescribing rizatriptan.  Past medical history significant for hyperlipidemia sleep apnea and he does not use the CPAP regularly.  And hepatic steatosis.  He follows with PCP/pulmonary in this regard.   Medication includes rizatriptan 10 mg ODT which she takes 1 at onset with variable results.  This is the only prescription medication that he has been prescribed.  He also receives atorvastatin.  Family history positive for his brother with migraine.

## 2024-09-10 ENCOUNTER — OUTPATIENT (OUTPATIENT)
Dept: OUTPATIENT SERVICES | Facility: HOSPITAL | Age: 54
LOS: 1 days | End: 2024-09-10
Payer: COMMERCIAL

## 2024-09-10 ENCOUNTER — APPOINTMENT (OUTPATIENT)
Dept: MRI IMAGING | Facility: CLINIC | Age: 54
End: 2024-09-10
Payer: COMMERCIAL

## 2024-09-10 ENCOUNTER — NON-APPOINTMENT (OUTPATIENT)
Age: 54
End: 2024-09-10

## 2024-09-10 DIAGNOSIS — G43.109 MIGRAINE WITH AURA, NOT INTRACTABLE, WITHOUT STATUS MIGRAINOSUS: ICD-10-CM

## 2024-09-10 PROCEDURE — 70551 MRI BRAIN STEM W/O DYE: CPT | Mod: 26

## 2024-09-10 PROCEDURE — 70551 MRI BRAIN STEM W/O DYE: CPT

## 2024-10-03 ENCOUNTER — LABORATORY RESULT (OUTPATIENT)
Age: 54
End: 2024-10-03

## 2024-10-03 ENCOUNTER — APPOINTMENT (OUTPATIENT)
Dept: PULMONOLOGY | Facility: CLINIC | Age: 54
End: 2024-10-03
Payer: COMMERCIAL

## 2024-10-03 VITALS — OXYGEN SATURATION: 97 % | DIASTOLIC BLOOD PRESSURE: 74 MMHG | HEART RATE: 73 BPM | SYSTOLIC BLOOD PRESSURE: 107 MMHG

## 2024-10-03 DIAGNOSIS — E78.00 PURE HYPERCHOLESTEROLEMIA, UNSPECIFIED: ICD-10-CM

## 2024-10-03 DIAGNOSIS — K75.81 NONALCOHOLIC STEATOHEPATITIS (NASH): ICD-10-CM

## 2024-10-03 DIAGNOSIS — R06.83 SNORING: ICD-10-CM

## 2024-10-03 DIAGNOSIS — E74.39 OTHER DISORDERS OF INTESTINAL CARBOHYDRATE ABSORPTION: ICD-10-CM

## 2024-10-03 DIAGNOSIS — E55.9 VITAMIN D DEFICIENCY, UNSPECIFIED: ICD-10-CM

## 2024-10-03 DIAGNOSIS — G43.109 MIGRAINE WITH AURA, NOT INTRACTABLE, W/OUT STATUS MIGRAINOSUS: ICD-10-CM

## 2024-10-03 DIAGNOSIS — R03.0 ELEVATED BLOOD-PRESSURE READING, W/OUT DIAGNOSIS OF HYPERTENSION: ICD-10-CM

## 2024-10-03 DIAGNOSIS — E78.5 HYPERLIPIDEMIA, UNSPECIFIED: ICD-10-CM

## 2024-10-03 DIAGNOSIS — R09.82 POSTNASAL DRIP: ICD-10-CM

## 2024-10-03 DIAGNOSIS — G47.33 OBSTRUCTIVE SLEEP APNEA (ADULT) (PEDIATRIC): ICD-10-CM

## 2024-10-03 PROCEDURE — 36415 COLL VENOUS BLD VENIPUNCTURE: CPT

## 2024-10-03 PROCEDURE — 99214 OFFICE O/P EST MOD 30 MIN: CPT

## 2024-10-03 NOTE — REASON FOR VISIT
[Sleep Apnea] : sleep apnea [Follow-Up] : a follow-up visit [Hypersomnolence] : hypersomnolence  [Cough] : cough

## 2024-10-05 NOTE — PROCEDURE
[FreeTextEntry1] :  CT Abdomen and Pelvis w/ IV Cont             Final  No Documents Attached    	 EXAM: [id number] - CT ABDOMEN AND PELVIS IC  - ORDERED BY: VIRGIL HATCH   PROCEDURE DATE:  04/13/2024    INTERPRETATION:  CLINICAL INFORMATION: Epigastric pain. Rule out pancreatitis.  COMPARISON: CT abdomen and pelvis dated 4/14/2022.  CONTRAST/COMPLICATIONS: IV Contrast: Omnipaque 350  90 cc administered   10 cc discarded Oral Contrast: Water Complications: None reported at time of study completion  PROCEDURE: CT of the Abdomen and Pelvis was performed. Sagittal and coronal reformats were performed.  FINDINGS: LOWER CHEST: Within normal limits.  LIVER: Hepatic steatosis. Stable 1.3 cm left hepatic cyst. Right hepatic lobe nonenhancing hypodensity too small to characterize however likely represent a hepatic cyst. BILE DUCTS: There is mild dilation of the distal common bile duct to 1.0 cm, grossly unchanged from prior CT chest. GALLBLADDER: Within normal limits. SPLEEN: Within normal limits. PANCREAS: The pancreatic parenchyma is unremarkable. No peripancreatic fat stranding. ADRENALS: Within normal limits. KIDNEYS/URETERS: Symmetric enhancement. No hydronephrosis or perinephric stranding. Bilateral renal cysts.  BLADDER: Within normal limits. REPRODUCTIVE ORGANS: Prostate within normal limits.  BOWEL:  No bowel obstruction. Appendix is normal. PERITONEUM: No ascites or pneumoperitoneum. VESSELS: Atherosclerotic disease of the aorta. RETROPERITONEUM/LYMPH NODES: No lymphadenopathy. ABDOMINAL WALL: Small fat-containing umbilical hernia. BONES: Degenerative changes.  IMPRESSION:  No evidence of acute intra-abdominal pathology.    --- End of Report ---      SAUNDRA FELIX DO; Resident Radiologist This document has been electronically signed. SARAN ACOSTA MD; Attending Radiologist This document has been electronically signed. Apr 14 2024  6:11PM  Ordered by: VIRGIL HATCH       Collected/Examined: 13Apr2024 08:50AM       Verification Required       Stage: Final       Performed at: [facility] [place]       Resulted: 13Apr2024 11:05AM       Last Updated: 14Apr2024 06:14PM       Accession: [id number]

## 2024-10-05 NOTE — DATA REVIEWED
[FreeTextEntry1] : EXAM: [id number] - US ABDOMEN COMPLETE - ORDERED BY: PAULO DOVE   PROCEDURE DATE: 01/23/2024    INTERPRETATION: CLINICAL INFORMATION: RIGHT upper quadrant pain  COMPARISON: November 2020, CT 4/14/2022  TECHNIQUE: Sonography of the abdomen. Exam limited by intestinal bowel gas  FINDINGS: Liver: Moderate hepatic steatosis Bile ducts: Normal caliber. Common bile duct measures 5 mm. Gallbladder: Within normal limits. Pancreas: Largely obscured Spleen: 9.9 cm. Within normal limits. Right kidney: 9.0 cm. No hydronephrosis. Subcentimeter sized lower pole cyst Left kidney: 9.7 cm. No hydronephrosis. 19 x 17 mm lower pole cyst Ascites: None. Aorta and IVC: Visualized portions are within normal limits.  IMPRESSION: Moderate hepatic steatosis.    --- End of Report ---       JARON BAPTISTE MD; Attending Radiologist This document has been electronically signed. Jan 24 2024 5:56PM

## 2024-10-05 NOTE — HISTORY OF PRESENT ILLNESS
[FreeTextEntry1] : Here for annual physical examination today. [de-identified] : Dalton is a pleasant 53-year-old gentleman with history of hepatic steatosis, recently diagnosed severe obstructive sleep apnea, obesity, he came in for today.  He states that he recently started using APAP due to discomfort with nasal mask. [TextBox_4] : c/o dry cough, no CP/SOB

## 2024-10-05 NOTE — PROCEDURE
[FreeTextEntry1] :  CT Abdomen and Pelvis w/ IV Cont             Final  No Documents Attached    	 EXAM: [id number] - CT ABDOMEN AND PELVIS IC  - ORDERED BY: VIRGIL HATCH   PROCEDURE DATE:  04/13/2024    INTERPRETATION:  CLINICAL INFORMATION: Epigastric pain. Rule out pancreatitis.  COMPARISON: CT abdomen and pelvis dated 4/14/2022.  CONTRAST/COMPLICATIONS: IV Contrast: Omnipaque 350  90 cc administered   10 cc discarded Oral Contrast: Water Complications: None reported at time of study completion  PROCEDURE: CT of the Abdomen and Pelvis was performed. Sagittal and coronal reformats were performed.  FINDINGS: LOWER CHEST: Within normal limits.  LIVER: Hepatic steatosis. Stable 1.3 cm left hepatic cyst. Right hepatic lobe nonenhancing hypodensity too small to characterize however likely represent a hepatic cyst. BILE DUCTS: There is mild dilation of the distal common bile duct to 1.0 cm, grossly unchanged from prior CT chest. GALLBLADDER: Within normal limits. SPLEEN: Within normal limits. PANCREAS: The pancreatic parenchyma is unremarkable. No peripancreatic fat stranding. ADRENALS: Within normal limits. KIDNEYS/URETERS: Symmetric enhancement. No hydronephrosis or perinephric stranding. Bilateral renal cysts.  BLADDER: Within normal limits. REPRODUCTIVE ORGANS: Prostate within normal limits.  BOWEL:  No bowel obstruction. Appendix is normal. PERITONEUM: No ascites or pneumoperitoneum. VESSELS: Atherosclerotic disease of the aorta. RETROPERITONEUM/LYMPH NODES: No lymphadenopathy. ABDOMINAL WALL: Small fat-containing umbilical hernia. BONES: Degenerative changes.  IMPRESSION:  No evidence of acute intra-abdominal pathology.    --- End of Report ---      SAUNDRA FELIX DO; Resident Radiologist This document has been electronically signed. SARAN ACOSTA MD; Attending Radiologist This document has been electronically signed. Apr 14 2024  6:11PM  Ordered by: VIRGIL HATCH       Collected/Examined: 13Apr2024 08:50AM       Verification Required       Stage: Final       Performed at: [facility] [place]       Resulted: 13Apr2024 11:05AM       Last Updated: 14Apr2024 06:14PM       Accession: [id number]

## 2024-10-05 NOTE — HISTORY OF PRESENT ILLNESS
[FreeTextEntry1] : Here for annual physical examination today. [de-identified] : Dalton is a pleasant 53-year-old gentleman with history of hepatic steatosis, recently diagnosed severe obstructive sleep apnea, obesity, he came in for today.  He states that he recently started using APAP due to discomfort with nasal mask. [TextBox_4] : c/o dry cough, no CP/SOB

## 2024-10-05 NOTE — DATA REVIEWED
[FreeTextEntry1] : EXAM: [id number] - US ABDOMEN COMPLETE - ORDERED BY: PAULO DOVE   PROCEDURE DATE: 01/23/2024    INTERPRETATION: CLINICAL INFORMATION: RIGHT upper quadrant pain  COMPARISON: November 2020, CT 4/14/2022  TECHNIQUE: Sonography of the abdomen. Exam limited by intestinal bowel gas  FINDINGS: Liver: Moderate hepatic steatosis Bile ducts: Normal caliber. Common bile duct measures 5 mm. Gallbladder: Within normal limits. Pancreas: Largely obscured Spleen: 9.9 cm. Within normal limits. Right kidney: 9.0 cm. No hydronephrosis. Subcentimeter sized lower pole cyst Left kidney: 9.7 cm. No hydronephrosis. 19 x 17 mm lower pole cyst Ascites: None. Aorta and IVC: Visualized portions are within normal limits.  IMPRESSION: Moderate hepatic steatosis.    --- End of Report ---       JARON BAPTISTE MD; Attending Radiologist This document has been electronically signed. Jan 24 2024 5:56PM

## 2024-10-05 NOTE — PLAN
[FreeTextEntry1] : Auto-titrating Positive Airway Pressure(APAP) compliance reviewed with patient in office today. Patient is not compliant, but benefiting from APAP usage. Encouraged patient on increasing CPAP compliance.   Start Flonase nasal spray for postnasal drip. Patient nasal mask for better patient comfort and compliance.   Continue atorvastatin 10 mg p.o. daily for hypercholesterolemia. Strongly advised patient on dieting, exercise and weight loss. Medications reviewed. Continue present medications. Return for office follow-up in 2 months.

## 2024-10-06 LAB
ALBUMIN SERPL ELPH-MCNC: 4.6 G/DL
ALP BLD-CCNC: 90 U/L
ALT SERPL-CCNC: 34 U/L
ANION GAP SERPL CALC-SCNC: 14 MMOL/L
AST SERPL-CCNC: 24 U/L
BILIRUB SERPL-MCNC: 1 MG/DL
BUN SERPL-MCNC: 14 MG/DL
CALCIUM SERPL-MCNC: 9.1 MG/DL
CHLORIDE SERPL-SCNC: 106 MMOL/L
CHOLEST SERPL-MCNC: 169 MG/DL
CO2 SERPL-SCNC: 24 MMOL/L
CREAT SERPL-MCNC: 1.16 MG/DL
EGFR: 75 ML/MIN/1.73M2
ESTIMATED AVERAGE GLUCOSE: 117 MG/DL
GLUCOSE SERPL-MCNC: 91 MG/DL
HBA1C MFR BLD HPLC: 5.7 %
HDLC SERPL-MCNC: 45 MG/DL
LDLC SERPL CALC-MCNC: 86 MG/DL
NONHDLC SERPL-MCNC: 124 MG/DL
POTASSIUM SERPL-SCNC: 4 MMOL/L
PROT SERPL-MCNC: 6.9 G/DL
SODIUM SERPL-SCNC: 144 MMOL/L
TRIGL SERPL-MCNC: 230 MG/DL

## 2025-01-09 ENCOUNTER — APPOINTMENT (OUTPATIENT)
Dept: PULMONOLOGY | Facility: CLINIC | Age: 55
End: 2025-01-09
Payer: COMMERCIAL

## 2025-01-09 VITALS — SYSTOLIC BLOOD PRESSURE: 109 MMHG | DIASTOLIC BLOOD PRESSURE: 72 MMHG | OXYGEN SATURATION: 95 % | HEART RATE: 74 BPM

## 2025-01-09 DIAGNOSIS — E78.00 PURE HYPERCHOLESTEROLEMIA, UNSPECIFIED: ICD-10-CM

## 2025-01-09 DIAGNOSIS — G47.00 INSOMNIA, UNSPECIFIED: ICD-10-CM

## 2025-01-09 DIAGNOSIS — E66.9 OBESITY, UNSPECIFIED: ICD-10-CM

## 2025-01-09 DIAGNOSIS — E74.39 OTHER DISORDERS OF INTESTINAL CARBOHYDRATE ABSORPTION: ICD-10-CM

## 2025-01-09 DIAGNOSIS — G47.33 OBSTRUCTIVE SLEEP APNEA (ADULT) (PEDIATRIC): ICD-10-CM

## 2025-01-09 LAB
ALBUMIN SERPL ELPH-MCNC: 4.5 G/DL
ALP BLD-CCNC: 97 U/L
ALT SERPL-CCNC: 34 U/L
AST SERPL-CCNC: 24 U/L
BILIRUB DIRECT SERPL-MCNC: 0.2 MG/DL
BILIRUB INDIRECT SERPL-MCNC: 0.6 MG/DL
BILIRUB SERPL-MCNC: 0.8 MG/DL
CHOLEST SERPL-MCNC: 139 MG/DL
ESTIMATED AVERAGE GLUCOSE: 114 MG/DL
HBA1C MFR BLD HPLC: 5.6 %
HDLC SERPL-MCNC: 39 MG/DL
LDLC SERPL CALC-MCNC: 72 MG/DL
NONHDLC SERPL-MCNC: 100 MG/DL
PROT SERPL-MCNC: 6.8 G/DL
TRIGL SERPL-MCNC: 162 MG/DL

## 2025-01-09 PROCEDURE — 99214 OFFICE O/P EST MOD 30 MIN: CPT | Mod: 25

## 2025-01-09 PROCEDURE — 36415 COLL VENOUS BLD VENIPUNCTURE: CPT

## 2025-01-09 RX ORDER — MIRTAZAPINE 7.5 MG/1
7.5 TABLET, FILM COATED ORAL
Qty: 30 | Refills: 1 | Status: ACTIVE | COMMUNITY
Start: 2025-01-09 | End: 1900-01-01

## 2025-01-30 ENCOUNTER — NON-APPOINTMENT (OUTPATIENT)
Age: 55
End: 2025-01-30

## 2025-01-30 ENCOUNTER — APPOINTMENT (OUTPATIENT)
Dept: NEUROLOGY | Facility: CLINIC | Age: 55
End: 2025-01-30
Payer: COMMERCIAL

## 2025-01-30 VITALS
BODY MASS INDEX: 31.5 KG/M2 | HEART RATE: 71 BPM | SYSTOLIC BLOOD PRESSURE: 119 MMHG | HEIGHT: 66 IN | TEMPERATURE: 98.1 F | OXYGEN SATURATION: 96 % | DIASTOLIC BLOOD PRESSURE: 78 MMHG | WEIGHT: 196 LBS

## 2025-01-30 VITALS
OXYGEN SATURATION: 96 % | WEIGHT: 196 LBS | DIASTOLIC BLOOD PRESSURE: 78 MMHG | SYSTOLIC BLOOD PRESSURE: 119 MMHG | HEART RATE: 71 BPM | TEMPERATURE: 98.1 F | BODY MASS INDEX: 31.5 KG/M2 | HEIGHT: 66 IN

## 2025-01-30 DIAGNOSIS — G43.109 MIGRAINE WITH AURA, NOT INTRACTABLE, W/OUT STATUS MIGRAINOSUS: ICD-10-CM

## 2025-01-30 PROCEDURE — 99214 OFFICE O/P EST MOD 30 MIN: CPT

## 2025-01-30 PROCEDURE — G2211 COMPLEX E/M VISIT ADD ON: CPT

## 2025-01-30 RX ORDER — MAGNESIUM OXIDE/MAG AA CHELATE 300 MG
CAPSULE ORAL
Refills: 0 | Status: ACTIVE | COMMUNITY

## 2025-01-31 RX ORDER — RIMEGEPANT SULFATE 75 MG/75MG
75 TABLET, ORALLY DISINTEGRATING ORAL
Qty: 16 | Refills: 5 | Status: ACTIVE | COMMUNITY
Start: 2025-01-30 | End: 1900-01-01

## 2025-02-20 ENCOUNTER — RX RENEWAL (OUTPATIENT)
Age: 55
End: 2025-02-20

## 2025-02-24 ENCOUNTER — RX RENEWAL (OUTPATIENT)
Age: 55
End: 2025-02-24

## 2025-04-02 DIAGNOSIS — E74.39 OTHER DISORDERS OF INTESTINAL CARBOHYDRATE ABSORPTION: ICD-10-CM

## 2025-04-05 ENCOUNTER — LABORATORY RESULT (OUTPATIENT)
Age: 55
End: 2025-04-05

## 2025-04-06 LAB
25(OH)D3 SERPL-MCNC: 37.8 NG/ML
ALBUMIN SERPL ELPH-MCNC: 4.4 G/DL
ALP BLD-CCNC: 97 U/L
ALT SERPL-CCNC: 27 U/L
ANION GAP SERPL CALC-SCNC: 14 MMOL/L
AST SERPL-CCNC: 26 U/L
BASOPHILS # BLD AUTO: 0.04 K/UL
BASOPHILS NFR BLD AUTO: 0.8 %
BILIRUB SERPL-MCNC: 0.7 MG/DL
BUN SERPL-MCNC: 14 MG/DL
CALCIUM SERPL-MCNC: 9 MG/DL
CHLORIDE SERPL-SCNC: 108 MMOL/L
CHOLEST SERPL-MCNC: 145 MG/DL
CO2 SERPL-SCNC: 25 MMOL/L
CREAT SERPL-MCNC: 1.31 MG/DL
EGFRCR SERPLBLD CKD-EPI 2021: 64 ML/MIN/1.73M2
EOSINOPHIL # BLD AUTO: 0.19 K/UL
EOSINOPHIL NFR BLD AUTO: 3.6 %
ESTIMATED AVERAGE GLUCOSE: 114 MG/DL
GLUCOSE SERPL-MCNC: 103 MG/DL
HBA1C MFR BLD HPLC: 5.6 %
HCT VFR BLD CALC: 47.9 %
HCV AB SER QL: NONREACTIVE
HCV S/CO RATIO: 0.18 S/CO
HDLC SERPL-MCNC: 38 MG/DL
HGB BLD-MCNC: 15.9 G/DL
IMM GRANULOCYTES NFR BLD AUTO: 0.2 %
LDLC SERPL-MCNC: 70 MG/DL
LYMPHOCYTES # BLD AUTO: 1.87 K/UL
LYMPHOCYTES NFR BLD AUTO: 35.6 %
MAGNESIUM SERPL-MCNC: 2.4 MG/DL
MAN DIFF?: NORMAL
MCHC RBC-ENTMCNC: 29.2 PG
MCHC RBC-ENTMCNC: 33.2 G/DL
MCV RBC AUTO: 87.9 FL
MONOCYTES # BLD AUTO: 0.37 K/UL
MONOCYTES NFR BLD AUTO: 7 %
NEUTROPHILS # BLD AUTO: 2.78 K/UL
NEUTROPHILS NFR BLD AUTO: 52.8 %
NONHDLC SERPL-MCNC: 106 MG/DL
PHOSPHATE SERPL-MCNC: 3.6 MG/DL
PLATELET # BLD AUTO: 158 K/UL
POTASSIUM SERPL-SCNC: 4.2 MMOL/L
PROT SERPL-MCNC: 6.7 G/DL
PSA SERPL-MCNC: 0.31 NG/ML
RBC # BLD: 5.45 M/UL
RBC # FLD: 12.2 %
SODIUM SERPL-SCNC: 146 MMOL/L
T3 SERPL-MCNC: 96 NG/DL
T3RU NFR SERPL: 0.9 TBI
T4 FREE SERPL-MCNC: 1.4 NG/DL
T4 SERPL-MCNC: 5.8 UG/DL
TRIGL SERPL-MCNC: 219 MG/DL
TSH SERPL-ACNC: 2.23 UIU/ML
WBC # FLD AUTO: 5.26 K/UL

## 2025-04-10 ENCOUNTER — APPOINTMENT (OUTPATIENT)
Dept: PULMONOLOGY | Facility: CLINIC | Age: 55
End: 2025-04-10
Payer: COMMERCIAL

## 2025-04-10 ENCOUNTER — NON-APPOINTMENT (OUTPATIENT)
Age: 55
End: 2025-04-10

## 2025-04-10 ENCOUNTER — RESULT CHARGE (OUTPATIENT)
Age: 55
End: 2025-04-10

## 2025-04-10 VITALS
BODY MASS INDEX: 31.66 KG/M2 | DIASTOLIC BLOOD PRESSURE: 78 MMHG | OXYGEN SATURATION: 95 % | HEIGHT: 66 IN | WEIGHT: 197 LBS | SYSTOLIC BLOOD PRESSURE: 125 MMHG | HEART RATE: 71 BPM

## 2025-04-10 DIAGNOSIS — E66.9 OBESITY, UNSPECIFIED: ICD-10-CM

## 2025-04-10 DIAGNOSIS — Z00.00 ENCOUNTER FOR GENERAL ADULT MEDICAL EXAMINATION W/OUT ABNORMAL FINDINGS: ICD-10-CM

## 2025-04-10 DIAGNOSIS — R05.9 COUGH, UNSPECIFIED: ICD-10-CM

## 2025-04-10 DIAGNOSIS — E55.9 VITAMIN D DEFICIENCY, UNSPECIFIED: ICD-10-CM

## 2025-04-10 DIAGNOSIS — G47.33 OBSTRUCTIVE SLEEP APNEA (ADULT) (PEDIATRIC): ICD-10-CM

## 2025-04-10 DIAGNOSIS — G47.00 INSOMNIA, UNSPECIFIED: ICD-10-CM

## 2025-04-10 PROCEDURE — 93000 ELECTROCARDIOGRAM COMPLETE: CPT

## 2025-04-10 PROCEDURE — 81003 URINALYSIS AUTO W/O SCOPE: CPT | Mod: QW

## 2025-04-10 PROCEDURE — 82044 UR ALBUMIN SEMIQUANTITATIVE: CPT | Mod: QW

## 2025-04-10 PROCEDURE — 99396 PREV VISIT EST AGE 40-64: CPT

## 2025-06-12 ENCOUNTER — APPOINTMENT (OUTPATIENT)
Dept: NEUROLOGY | Facility: CLINIC | Age: 55
End: 2025-06-12

## 2025-07-31 ENCOUNTER — LABORATORY RESULT (OUTPATIENT)
Age: 55
End: 2025-07-31

## 2025-07-31 ENCOUNTER — APPOINTMENT (OUTPATIENT)
Dept: PULMONOLOGY | Facility: CLINIC | Age: 55
End: 2025-07-31
Payer: COMMERCIAL

## 2025-07-31 VITALS — SYSTOLIC BLOOD PRESSURE: 134 MMHG | DIASTOLIC BLOOD PRESSURE: 89 MMHG | HEART RATE: 73 BPM | OXYGEN SATURATION: 97 %

## 2025-07-31 DIAGNOSIS — R03.0 ELEVATED BLOOD-PRESSURE READING, W/OUT DIAGNOSIS OF HYPERTENSION: ICD-10-CM

## 2025-07-31 DIAGNOSIS — G47.33 OBSTRUCTIVE SLEEP APNEA (ADULT) (PEDIATRIC): ICD-10-CM

## 2025-07-31 DIAGNOSIS — E78.5 HYPERLIPIDEMIA, UNSPECIFIED: ICD-10-CM

## 2025-07-31 DIAGNOSIS — K75.81 NONALCOHOLIC STEATOHEPATITIS (NASH): ICD-10-CM

## 2025-07-31 DIAGNOSIS — E74.39 OTHER DISORDERS OF INTESTINAL CARBOHYDRATE ABSORPTION: ICD-10-CM

## 2025-07-31 PROCEDURE — 99214 OFFICE O/P EST MOD 30 MIN: CPT

## 2025-07-31 PROCEDURE — 36415 COLL VENOUS BLD VENIPUNCTURE: CPT

## 2025-07-31 PROCEDURE — G2211 COMPLEX E/M VISIT ADD ON: CPT

## 2025-08-03 LAB
ALBUMIN SERPL ELPH-MCNC: 4.7 G/DL
ALP BLD-CCNC: 93 U/L
ALT SERPL-CCNC: 37 U/L
ANION GAP SERPL CALC-SCNC: 14 MMOL/L
AST SERPL-CCNC: 28 U/L
BILIRUB SERPL-MCNC: 1.4 MG/DL
BUN SERPL-MCNC: 13 MG/DL
CALCIUM SERPL-MCNC: 9.2 MG/DL
CHLORIDE SERPL-SCNC: 105 MMOL/L
CHOLEST SERPL-MCNC: 176 MG/DL
CO2 SERPL-SCNC: 23 MMOL/L
CREAT SERPL-MCNC: 1.2 MG/DL
EGFRCR SERPLBLD CKD-EPI 2021: 71 ML/MIN/1.73M2
ESTIMATED AVERAGE GLUCOSE: 123 MG/DL
GLUCOSE SERPL-MCNC: 97 MG/DL
HBA1C MFR BLD HPLC: 5.9 %
HDLC SERPL-MCNC: 44 MG/DL
LDLC SERPL-MCNC: 99 MG/DL
NONHDLC SERPL-MCNC: 133 MG/DL
POTASSIUM SERPL-SCNC: 4.1 MMOL/L
PROT SERPL-MCNC: 7 G/DL
SODIUM SERPL-SCNC: 142 MMOL/L
TRIGL SERPL-MCNC: 197 MG/DL

## (undated) DEVICE — VENODYNE/SCD SLEEVE CALF LARGE

## (undated) DEVICE — ELCTR GROUNDING PAD ADULT COVIDIEN

## (undated) DEVICE — WARMING BLANKET UPPER ADULT

## (undated) DEVICE — PACK MINOR WITH LAP

## (undated) DEVICE — PREP BETADINE KIT

## (undated) DEVICE — ELCTR BOVIE TIP BLADE INSULATED 2.75" EDGE

## (undated) DEVICE — DRSG TELFA 3 X 8

## (undated) DEVICE — PLV-SCD MACHINE: Type: DURABLE MEDICAL EQUIPMENT

## (undated) DEVICE — DRSG DERMABOND 0.7ML

## (undated) DEVICE — SUT PDS II 2-0 27" SH

## (undated) DEVICE — SYR LUER LOK 20CC

## (undated) DEVICE — VENODYNE/SCD SLEEVE CALF MEDIUM

## (undated) DEVICE — PLV/PSP-ESU T7J19648DX: Type: DURABLE MEDICAL EQUIPMENT

## (undated) DEVICE — SUT POLYSORB 2-0 30" V-20 UNDYED

## (undated) DEVICE — GLV 7 PROTEXIS (WHITE)

## (undated) DEVICE — DRSG TEGADERM 4X4.75"